# Patient Record
Sex: FEMALE | Race: WHITE | NOT HISPANIC OR LATINO | Employment: OTHER | URBAN - METROPOLITAN AREA
[De-identification: names, ages, dates, MRNs, and addresses within clinical notes are randomized per-mention and may not be internally consistent; named-entity substitution may affect disease eponyms.]

---

## 2018-09-05 ENCOUNTER — OFFICE VISIT (OUTPATIENT)
Dept: MEDICAL GROUP | Facility: MEDICAL CENTER | Age: 78
End: 2018-09-05
Payer: MEDICARE

## 2018-09-05 VITALS
WEIGHT: 162 LBS | HEIGHT: 64 IN | SYSTOLIC BLOOD PRESSURE: 104 MMHG | TEMPERATURE: 97.5 F | RESPIRATION RATE: 15 BRPM | BODY MASS INDEX: 27.66 KG/M2 | OXYGEN SATURATION: 94 % | DIASTOLIC BLOOD PRESSURE: 60 MMHG | HEART RATE: 74 BPM

## 2018-09-05 DIAGNOSIS — Z12.83 SCREENING FOR MALIGNANT NEOPLASM OF SKIN: ICD-10-CM

## 2018-09-05 DIAGNOSIS — R41.3 MEMORY LOSS: ICD-10-CM

## 2018-09-05 DIAGNOSIS — K45.8 OTHER SPECIFIED ABDOMINAL HERNIA WITHOUT OBSTRUCTION OR GANGRENE: ICD-10-CM

## 2018-09-05 DIAGNOSIS — E55.9 VITAMIN D DEFICIENCY: ICD-10-CM

## 2018-09-05 DIAGNOSIS — G47.34 NOCTURNAL OXYGEN DESATURATION: ICD-10-CM

## 2018-09-05 DIAGNOSIS — K82.8 GALLBLADDER SLUDGE: ICD-10-CM

## 2018-09-05 DIAGNOSIS — Z13.6 SCREENING FOR CARDIOVASCULAR CONDITION: ICD-10-CM

## 2018-09-05 DIAGNOSIS — E03.9 HYPOTHYROIDISM, UNSPECIFIED TYPE: ICD-10-CM

## 2018-09-05 PROBLEM — I83.90 VARICOSE VEIN OF LEG: Status: ACTIVE | Noted: 2018-09-05

## 2018-09-05 PROBLEM — M19.90 ARTHRITIS: Status: ACTIVE | Noted: 2018-09-05

## 2018-09-05 PROCEDURE — 99203 OFFICE O/P NEW LOW 30 MIN: CPT | Performed by: NURSE PRACTITIONER

## 2018-09-05 RX ORDER — THYROID 90 MG/1
90 TABLET ORAL DAILY
COMMUNITY
End: 2018-09-05 | Stop reason: SDUPTHER

## 2018-09-05 RX ORDER — DIPHENHYDRAMINE HCL 25 MG
25 TABLET ORAL EVERY 6 HOURS PRN
COMMUNITY

## 2018-09-05 RX ORDER — CITALOPRAM 20 MG/1
TABLET ORAL
COMMUNITY
Start: 2018-07-13 | End: 2018-12-05 | Stop reason: SDUPTHER

## 2018-09-05 RX ORDER — LORATADINE 10 MG/1
10 TABLET ORAL DAILY
COMMUNITY
End: 2021-10-21

## 2018-09-05 RX ORDER — THYROID 60 MG/1
60 TABLET ORAL DAILY
COMMUNITY
End: 2018-09-05 | Stop reason: SDUPTHER

## 2018-09-05 RX ORDER — THYROID 90 MG/1
90 TABLET ORAL
Qty: 90 TAB | Refills: 1 | COMMUNITY
Start: 2018-09-05 | End: 2018-09-17 | Stop reason: SDUPTHER

## 2018-09-05 RX ORDER — THYROID 60 MG/1
60 TABLET ORAL
Qty: 90 TAB | Refills: 1 | COMMUNITY
Start: 2018-09-05 | End: 2018-09-17 | Stop reason: SDUPTHER

## 2018-09-05 RX ORDER — CALCIUM CARBONATE 500 MG/1
500 TABLET, CHEWABLE ORAL DAILY
COMMUNITY
End: 2022-07-07

## 2018-09-05 ASSESSMENT — PATIENT HEALTH QUESTIONNAIRE - PHQ9: CLINICAL INTERPRETATION OF PHQ2 SCORE: 0

## 2018-09-05 NOTE — LETTER
[facility]  Liss Tate A.P.R.N.  [address] [facility] [address]  [place] [address]  Fax: [phone]   Authorization for Release/Disclosure of   Protected Health Information   Name: ANGEL ZAMORA : 1940 SSN: [id number]   Address: [address] [place] [address]   [place] [address] Phone:    [phone] (home)    I authorize the entity listed below to release/disclose the PHI below to:   [facility]/Liss Tate, A.P.R.N. and Liss Tate A.P.R.N.   Provider or Entity Name:  [facility]   Address   City, State, Zip   Phone:      Fax:     Reason for request: continuity of care   Information to be released:    [x  ] LAST COLONOSCOPY,  including any PATH REPORT and follow-up  [  ] LAST FIT/COLOGUARD RESULT [  ] LAST DEXA  [  ] LAST MAMMOGRAM  [  ] LAST PAP  [  ] LAST LABS [  ] RETINA EXAM REPORT  [  ] IMMUNIZATION RECORDS  [  ] Release all info      [  ] Check here and initial the line next to each item to release ALL health information INCLUDING  _____ Care and treatment for drug and / or alcohol abuse  _____ HIV testing, infection status, or AIDS  _____ Genetic Testing    DATES OF SERVICE OR TIME PERIOD TO BE DISCLOSED: _____________  I understand and acknowledge that:  * This Authorization may be revoked at any time by you in writing, except if your health information has already been used or disclosed.  * Your health information that will be used or disclosed as a result of you signing this authorization could be re-disclosed by the recipient. If this occurs, your re-disclosed health information may no longer be protected by State or Federal laws.  * You may refuse to sign this Authorization. Your refusal will not affect your ability to obtain treatment.  * This Authorization becomes effective upon signing and will  on (date) __________.      If no date is indicated, this Authorization will  one (1) year from the signature date.    Name: Agnel Zamora    Signature:   Date:          9/5/2018       PLEASE FAX REQUESTED RECORDS BACK TO: [phone]

## 2018-09-05 NOTE — LETTER
[facility]  Liss Tate A.P.R.N.  [address] [facility] [address]  [place] [address]  Fax: [phone]   Authorization for Release/Disclosure of   Protected Health Information   Name: ANGEL ZAMORA : 1940 SSN: [id number]   Address: [address] [place] [address]   [place] [address] Phone:    [phone] (home)    I authorize the entity listed below to release/disclose the PHI below to:   [facility]/Liss Tate, A.P.R.N. and Liss Tate A.P.R.N.   Provider or Entity Name:  [facility]   Address   City, State, Zip   Phone:      Fax:     Reason for request: continuity of care   Information to be released:    [  ] LAST COLONOSCOPY,  including any PATH REPORT and follow-up  [  ] LAST FIT/COLOGUARD RESULT [  ] LAST DEXA  [  ] LAST MAMMOGRAM  [  ] LAST PAP  [  ] LAST LABS [  ] RETINA EXAM REPORT  [  ] IMMUNIZATION RECORDS  [  x] Release all info      [  ] Check here and initial the line next to each item to release ALL health information INCLUDING  _____ Care and treatment for drug and / or alcohol abuse  _____ HIV testing, infection status, or AIDS  _____ Genetic Testing    DATES OF SERVICE OR TIME PERIOD TO BE DISCLOSED: _____________  I understand and acknowledge that:  * This Authorization may be revoked at any time by you in writing, except if your health information has already been used or disclosed.  * Your health information that will be used or disclosed as a result of you signing this authorization could be re-disclosed by the recipient. If this occurs, your re-disclosed health information may no longer be protected by State or Federal laws.  * You may refuse to sign this Authorization. Your refusal will not affect your ability to obtain treatment.  * This Authorization becomes effective upon signing and will  on (date) __________.      If no date is indicated, this Authorization will  one (1) year from the signature date.    Name: Angel Zamora    Signature:   Date:          9/5/2018       PLEASE FAX REQUESTED RECORDS BACK TO: [phone]

## 2018-09-05 NOTE — PROGRESS NOTES
"CC: establish care, thyroid       Anjum Ruff is a 77 y.o. female here to establish care and to discuss the evaluation and management of:    Former patient of Dr. Hein      1. Hypothyroidism, unspecified type  Patient states that she currently takes a  \"pig\" thyroid and has for about a year.  Last labs for TSH were in January of this year her TSH was 0.351.  States that she has been taking 60 mg of the NP thyroid every other day alternating with the 90 mg of the NP thyroid.    Nocturnal oxygen  Sleep study.  States she was told that her legs were jerking so much during the sleep study they thought she was not getting enough oxygen.  States she has been wearing this for about 2 years.      Gall bladder sludge  Patient states she was told she has some gallbladder sludge.  States that sometimes when she lifts her arms and when she is sitting in her bed or chair crunched up this will cause her discomfort in her right upper quadrant.  States that she has to change position in order for her to feel better.      Abdominal hernia  Patient states she was told she has a hernia in her left lower abdomen.  Denies any nausea, vomiting, pain or change in bowel habits.  D lump, like a hernia a connective tissue has a worn away and the bowel .     Stress  Patient states that she has been taking Celexa since her  .  States that he was a quadraplegic and she was taking care of him.  States that she does feel like she is still under some stress especially financial.      ROS:  Denies any Headache, Blurred Vision, Confusion Chest pain,  Shortness of breath,  Abdominal pain, Changes of bowel or bladder, Lower ext edema, Fevers, Nights sweats, Weight Changes, Focal weakness or numbness.  All other systems are negative.  Stress, abdominal hernia      Current Outpatient Prescriptions:   •  citalopram (CELEXA) 20 MG Tab, , Disp: , Rfl:   •  diphenhydrAMINE (BENADRYL) 25 MG Tab, Take 25 mg by mouth every 6 hours as needed for " "Sleep., Disp: , Rfl:   •  loratadine (CLARITIN) 10 MG Tab, Take 10 mg by mouth every day., Disp: , Rfl:   •  calcium carbonate (TUMS) 500 MG Chew Tab, Take 500 mg by mouth every day., Disp: , Rfl:   •  thyroid (NP THYROID) 60 MG Tab, Take 1 Tab by mouth every 48 hours., Disp: 90 Tab, Rfl: 1  •  thyroid (NP THYROID) 90 MG Tab, Take 1 Tab by mouth every 48 hours., Disp: 90 Tab, Rfl: 1    Allergies   Allergen Reactions   • Vitamin D        Past Medical History:   Diagnosis Date   • Dental infection     waiting for dental infection.   • Hashimoto's thyroiditis    • Hiatal hernia    • Hypothyroid    • Macular degeneration    • Ocular migraine    • Post menopausal syndrome    • Reflux      Past Surgical History:   Procedure Laterality Date   • HIATAL HERNIA REPAIR     • TONSILLECTOMY       Family History   Problem Relation Age of Onset   • Cancer Mother         lung-smoking   • Cancer Sister         colon mets to liver   • Hypertension Sister         melanoma with mets   • Other Sister         enodmetrial cancer     Social History     Social History   • Marital status:      Spouse name: N/A   • Number of children: N/A   • Years of education: N/A     Occupational History   • Not on file.     Social History Main Topics   • Smoking status: Former Smoker   • Smokeless tobacco: Never Used   • Alcohol use Yes      Comment: occ   • Drug use: No   • Sexual activity: Yes     Partners: Male     Other Topics Concern   • Not on file     Social History Narrative   • No narrative on file       Objective:     Vitals: /60   Pulse 74   Temp 36.4 °C (97.5 °F)   Resp 15   Ht 1.613 m (5' 3.5\")   Wt 73.5 kg (162 lb)   SpO2 94%   BMI 28.25 kg/m²      General: Alert, pleasant, NAD  HEENT:  Normocephalic.    Heart:  Regular rate and rhythm.  S1 and S2 normal.  No murmurs appreciated.  Respiratory:  Normal respiratory effort.  Clear to auscultation bilaterally.    Abdomen: Left lower quadrant slight bulge, no pain   skin:  " Warm, dry, no rashes  Musculoskeletal:  Gait is normal.  Moves all extremities well.  Extremities:No leg edema.  Neurological: No tremors, sensation grossly intact  Psych:  Affect/mood is normal, judgement is good, memory is intact, grooming is appropriate.      Assessment and Plan.   77 y.o. female to establish care and discuss following    Diagnoses and all orders for this visit:    Hypothyroidism, unspecified type  Most recent TSH was 0.351 back in January.  She has been taking the NP thyroid 60 mg every other day alternating with the 90 mg.  Continue  current dose.  -     COMP METABOLIC PANEL; Future  -     TSH WITH REFLEX TO FT4; Future    Gallbladder sludge  -     US-LIVER AND BILIARY TREE; Future    Other specified abdominal hernia without obstruction or gangrene  Stable.  No significant abdominal pain, nonobstructing.  Continue to monitor.    Vitamin D deficiency  -     VITAMIN D,25 HYDROXY; Future    Memory loss  -     CBC WITHOUT DIFFERENTIAL; Future  -     VITAMIN B12; Future    Nocturnal oxygen desaturation  Chronic.  Continue wearing 2 L of oxygen at night.      Screening for malignant neoplasm of skin  -     REFERRAL TO DERMATOLOGY    Screening for cardiovascular condition  -     LIPID PROFILE; Future      Health Maintenance:     Return in about 3 months (around 12/5/2018).        Liss FREY

## 2018-09-07 PROBLEM — E55.9 VITAMIN D DEFICIENCY: Status: ACTIVE | Noted: 2018-09-07

## 2018-09-07 PROBLEM — G47.34 NOCTURNAL OXYGEN DESATURATION: Status: ACTIVE | Noted: 2018-09-07

## 2018-09-10 ENCOUNTER — HOSPITAL ENCOUNTER (OUTPATIENT)
Dept: LAB | Facility: MEDICAL CENTER | Age: 78
End: 2018-09-10
Attending: NURSE PRACTITIONER
Payer: MEDICARE

## 2018-09-10 ENCOUNTER — HOSPITAL ENCOUNTER (OUTPATIENT)
Dept: RADIOLOGY | Facility: MEDICAL CENTER | Age: 78
End: 2018-09-10
Attending: NURSE PRACTITIONER
Payer: MEDICARE

## 2018-09-10 DIAGNOSIS — E55.9 VITAMIN D DEFICIENCY: ICD-10-CM

## 2018-09-10 DIAGNOSIS — Z13.6 SCREENING FOR CARDIOVASCULAR CONDITION: ICD-10-CM

## 2018-09-10 DIAGNOSIS — E03.9 HYPOTHYROIDISM, UNSPECIFIED TYPE: ICD-10-CM

## 2018-09-10 DIAGNOSIS — R41.3 MEMORY LOSS: ICD-10-CM

## 2018-09-10 DIAGNOSIS — K82.8 GALLBLADDER SLUDGE: ICD-10-CM

## 2018-09-10 LAB
25(OH)D3 SERPL-MCNC: 29 NG/ML (ref 30–100)
ALBUMIN SERPL BCP-MCNC: 4.1 G/DL (ref 3.2–4.9)
ALBUMIN/GLOB SERPL: 1.3 G/DL
ALP SERPL-CCNC: 36 U/L (ref 30–99)
ALT SERPL-CCNC: 12 U/L (ref 2–50)
ANION GAP SERPL CALC-SCNC: 5 MMOL/L (ref 0–11.9)
AST SERPL-CCNC: 16 U/L (ref 12–45)
BILIRUB SERPL-MCNC: 0.4 MG/DL (ref 0.1–1.5)
BUN SERPL-MCNC: 16 MG/DL (ref 8–22)
CALCIUM SERPL-MCNC: 9.2 MG/DL (ref 8.5–10.5)
CHLORIDE SERPL-SCNC: 103 MMOL/L (ref 96–112)
CHOLEST SERPL-MCNC: 170 MG/DL (ref 100–199)
CO2 SERPL-SCNC: 27 MMOL/L (ref 20–33)
CREAT SERPL-MCNC: 0.65 MG/DL (ref 0.5–1.4)
ERYTHROCYTE [DISTWIDTH] IN BLOOD BY AUTOMATED COUNT: 51.3 FL (ref 35.9–50)
GLOBULIN SER CALC-MCNC: 3.1 G/DL (ref 1.9–3.5)
GLUCOSE SERPL-MCNC: 87 MG/DL (ref 65–99)
HCT VFR BLD AUTO: 38.2 % (ref 37–47)
HDLC SERPL-MCNC: 54 MG/DL
HGB BLD-MCNC: 12.5 G/DL (ref 12–16)
LDLC SERPL CALC-MCNC: 92 MG/DL
MCH RBC QN AUTO: 30.9 PG (ref 27–33)
MCHC RBC AUTO-ENTMCNC: 32.7 G/DL (ref 33.6–35)
MCV RBC AUTO: 94.6 FL (ref 81.4–97.8)
PLATELET # BLD AUTO: 156 K/UL (ref 164–446)
PMV BLD AUTO: 11.6 FL (ref 9–12.9)
POTASSIUM SERPL-SCNC: 3.7 MMOL/L (ref 3.6–5.5)
PROT SERPL-MCNC: 7.2 G/DL (ref 6–8.2)
RBC # BLD AUTO: 4.04 M/UL (ref 4.2–5.4)
SODIUM SERPL-SCNC: 135 MMOL/L (ref 135–145)
TRIGL SERPL-MCNC: 119 MG/DL (ref 0–149)
TSH SERPL DL<=0.005 MIU/L-ACNC: 3.81 UIU/ML (ref 0.38–5.33)
VIT B12 SERPL-MCNC: 269 PG/ML (ref 211–911)
WBC # BLD AUTO: 5.2 K/UL (ref 4.8–10.8)

## 2018-09-10 PROCEDURE — 80061 LIPID PANEL: CPT

## 2018-09-10 PROCEDURE — 36415 COLL VENOUS BLD VENIPUNCTURE: CPT

## 2018-09-10 PROCEDURE — 82306 VITAMIN D 25 HYDROXY: CPT

## 2018-09-10 PROCEDURE — 85027 COMPLETE CBC AUTOMATED: CPT

## 2018-09-10 PROCEDURE — 84443 ASSAY THYROID STIM HORMONE: CPT

## 2018-09-10 PROCEDURE — 82607 VITAMIN B-12: CPT

## 2018-09-10 PROCEDURE — 76705 ECHO EXAM OF ABDOMEN: CPT

## 2018-09-10 PROCEDURE — 80053 COMPREHEN METABOLIC PANEL: CPT

## 2018-09-14 PROBLEM — K20.90 ESOPHAGITIS: Status: ACTIVE | Noted: 2018-09-14

## 2018-09-17 RX ORDER — THYROID 60 MG/1
60 TABLET ORAL
Qty: 90 TAB | Refills: 1 | Status: SHIPPED | OUTPATIENT
Start: 2018-09-17 | End: 2018-12-05 | Stop reason: SDUPTHER

## 2018-09-17 RX ORDER — THYROID 90 MG/1
90 TABLET ORAL
Qty: 90 TAB | Refills: 1 | Status: SHIPPED | OUTPATIENT
Start: 2018-09-17 | End: 2018-12-05 | Stop reason: SDUPTHER

## 2018-12-05 ENCOUNTER — OFFICE VISIT (OUTPATIENT)
Dept: MEDICAL GROUP | Facility: MEDICAL CENTER | Age: 78
End: 2018-12-05
Payer: MEDICARE

## 2018-12-05 VITALS
TEMPERATURE: 97.3 F | HEIGHT: 63 IN | WEIGHT: 166 LBS | OXYGEN SATURATION: 95 % | RESPIRATION RATE: 16 BRPM | DIASTOLIC BLOOD PRESSURE: 70 MMHG | SYSTOLIC BLOOD PRESSURE: 100 MMHG | BODY MASS INDEX: 29.41 KG/M2 | HEART RATE: 73 BPM

## 2018-12-05 DIAGNOSIS — E03.9 HYPOTHYROIDISM, UNSPECIFIED TYPE: ICD-10-CM

## 2018-12-05 DIAGNOSIS — L30.9 ECZEMA, UNSPECIFIED TYPE: ICD-10-CM

## 2018-12-05 DIAGNOSIS — Z23 NEED FOR VACCINATION: ICD-10-CM

## 2018-12-05 DIAGNOSIS — F43.9 STRESS: ICD-10-CM

## 2018-12-05 PROCEDURE — G0009 ADMIN PNEUMOCOCCAL VACCINE: HCPCS | Performed by: NURSE PRACTITIONER

## 2018-12-05 PROCEDURE — 90662 IIV NO PRSV INCREASED AG IM: CPT | Performed by: NURSE PRACTITIONER

## 2018-12-05 PROCEDURE — G0008 ADMIN INFLUENZA VIRUS VAC: HCPCS | Performed by: NURSE PRACTITIONER

## 2018-12-05 PROCEDURE — 90732 PPSV23 VACC 2 YRS+ SUBQ/IM: CPT | Performed by: NURSE PRACTITIONER

## 2018-12-05 PROCEDURE — 99213 OFFICE O/P EST LOW 20 MIN: CPT | Mod: 25 | Performed by: NURSE PRACTITIONER

## 2018-12-05 RX ORDER — THYROID 90 MG/1
90 TABLET ORAL
Qty: 90 TAB | Refills: 1 | Status: SHIPPED | OUTPATIENT
Start: 2018-12-05 | End: 2020-06-09

## 2018-12-05 RX ORDER — CITALOPRAM 20 MG/1
20 TABLET ORAL DAILY
Qty: 90 TAB | Refills: 3 | Status: SHIPPED | OUTPATIENT
Start: 2018-12-05 | End: 2018-12-13 | Stop reason: SDUPTHER

## 2018-12-05 RX ORDER — TRIAMCINOLONE ACETONIDE 1 MG/G
1 CREAM TOPICAL 2 TIMES DAILY
Qty: 1 TUBE | Refills: 3 | Status: SHIPPED | OUTPATIENT
Start: 2018-12-05 | End: 2023-09-21 | Stop reason: SDUPTHER

## 2018-12-05 RX ORDER — THYROID 60 MG/1
60 TABLET ORAL
Qty: 90 TAB | Refills: 1 | Status: SHIPPED | OUTPATIENT
Start: 2018-12-05 | End: 2019-10-21 | Stop reason: SDUPTHER

## 2018-12-05 NOTE — PROGRESS NOTES
"cc:  Follow up thyroid      Subjective:     HPI:     Anjum Ruff is a 78 y.o. female here to discuss the evaluation and management of:      Thyroid  Has been taking her 60 mg of NP thyroid and her 90 mg of NP thyroid on altered states she is feeling relatively euthyroid.  Has had two episodes of feeling shaking, \"numb\" and cold. Has some sugar and it will go away. Has been happening for years.     Stress  Patient states that she continues to take her citalopram for this.  States it is effective.    Dry skin  Patient states that she is been having some dry skin and some itchy patches.  Denies any blisters or persistent rash.    Left hip pain and knee pain  Of note patient just wanted to notify me that she had some left hip pain and some knee pain.  States that she was trying a different pair shoes and then it seemed to aggravate her hip and her knee.  States she took some Motrin and then it resolved.  Currently denies any hip or knee pain.  She already wears orthotics in her shoes.    History of a dental infection  Again patient just wanted to notify me that she had a dental infection and took some antibiotic and it went away.  She does have a left lower dental bridge.    ROS:  Denies any Headache, Blurred Vision, Confusion, Chest pain,  Shortness of breath,  Abdominal pain, Changes of bowel or bladder, Lower ext edema, Fevers, Nights sweats, Weight Changes, Focal weakness or numbness.  And all other systems are negative.        Current Outpatient Prescriptions:   •  triamcinolone acetonide (KENALOG) 0.1 % Cream, Apply 1 g to affected area(s) 2 times a day., Disp: 1 Tube, Rfl: 3  •  thyroid (NP THYROID) 60 MG Tab, Take 1 Tab by mouth every 48 hours., Disp: 90 Tab, Rfl: 1  •  thyroid (NP THYROID) 90 MG Tab, Take 1 Tab by mouth every 48 hours., Disp: 90 Tab, Rfl: 1  •  citalopram (CELEXA) 20 MG Tab, Take 1 Tab by mouth every day., Disp: 90 Tab, Rfl: 3  •  diphenhydrAMINE (BENADRYL) 25 MG Tab, Take 25 mg by mouth " "every 6 hours as needed for Sleep., Disp: , Rfl:   •  loratadine (CLARITIN) 10 MG Tab, Take 10 mg by mouth every day., Disp: , Rfl:   •  calcium carbonate (TUMS) 500 MG Chew Tab, Take 500 mg by mouth every day., Disp: , Rfl:     Allergies   Allergen Reactions   • Vitamin D        Past Medical History:   Diagnosis Date   • Dental infection     waiting for dental infection.   • Hashimoto's thyroiditis    • Hiatal hernia    • Hypothyroid    • Macular degeneration    • Ocular migraine    • Post menopausal syndrome    • Reflux      Past Surgical History:   Procedure Laterality Date   • HIATAL HERNIA REPAIR     • TONSILLECTOMY       Family History   Problem Relation Age of Onset   • Cancer Mother         lung-smoking   • Cancer Sister         colon mets to liver   • Hypertension Sister         melanoma with mets   • Other Sister         enodmetrial cancer     Social History     Social History   • Marital status:      Spouse name: N/A   • Number of children: N/A   • Years of education: N/A     Occupational History   • Not on file.     Social History Main Topics   • Smoking status: Former Smoker   • Smokeless tobacco: Never Used   • Alcohol use Yes      Comment: occ   • Drug use: No   • Sexual activity: Yes     Partners: Male     Other Topics Concern   • Not on file     Social History Narrative   • No narrative on file       Objective:     Vitals: /70   Pulse 73   Temp 36.3 °C (97.3 °F)   Resp 16   Ht 1.6 m (5' 3\")   Wt 75.3 kg (166 lb)   SpO2 95%   BMI 29.41 kg/m²    General: Alert, pleasant, NAD  HEENT: Normocephalic.    Skin: Warm, dry, no rashes.  Dry skin.  Musculoskeletal: Gait is normal.  Moves all extremities well.  Extremities: No leg edema. No discoloration  Neurological: No tremors, sensation grossly intact, gait is normal,   Psych:  Affect/mood is normal, judgement is good, memory is intact, grooming is appropriate.    Assessment/Plan:     Anjum was seen today for follow-up.    Diagnoses " and all orders for this visit:    Hypothyroidism, unspecified type  Stable.  Needs labs.  Refills provided.  We will call her with the results.  -     TSH WITH REFLEX TO FT4; Future  -     BASIC METABOLIC PANEL; Future  -     thyroid (NP THYROID) 60 MG Tab; Take 1 Tab by mouth every 48 hours.  -     thyroid (NP THYROID) 90 MG Tab; Take 1 Tab by mouth every 48 hours.    Eczema, unspecified type  Have discussed with patient to try some Aquaphor ointment on her dry skin and then for her flare she can trial the triamcinolone.  -     triamcinolone acetonide (KENALOG) 0.1 % Cream; Apply 1 g to affected area(s) 2 times a day.    Stress  Stable.  Citalopram effective.  Refills provided.  -     citalopram (CELEXA) 20 MG Tab; Take 1 Tab by mouth every day.    Need for vaccination  -     Influenza Vaccine, High Dose (65+ Only)  -     Pneumococal Polysaccharide Vaccine 23-Valent =>1YO SQ/IM        Return in about 3 months (around 3/5/2019).    {I have placed the above orders and discussed them with an approved delegating provider.  The MA is performing the below orders under the direction of Dr. Artemio FREY

## 2018-12-10 PROBLEM — F43.9 STRESS: Status: ACTIVE | Noted: 2018-12-10

## 2018-12-10 PROBLEM — F43.9 STRESS: Chronic | Status: ACTIVE | Noted: 2018-12-10

## 2018-12-10 PROBLEM — K82.8 GALLBLADDER SLUDGE: Chronic | Status: ACTIVE | Noted: 2018-09-05

## 2018-12-10 PROBLEM — M19.90 ARTHRITIS: Chronic | Status: ACTIVE | Noted: 2018-09-05

## 2018-12-13 DIAGNOSIS — F43.9 STRESS: ICD-10-CM

## 2018-12-14 RX ORDER — CITALOPRAM 20 MG/1
20 TABLET ORAL DAILY
Qty: 90 TAB | Refills: 3 | Status: SHIPPED | OUTPATIENT
Start: 2018-12-14 | End: 2020-03-02

## 2018-12-22 ENCOUNTER — OFFICE VISIT (OUTPATIENT)
Dept: URGENT CARE | Facility: PHYSICIAN GROUP | Age: 78
End: 2018-12-22
Payer: MEDICARE

## 2018-12-22 VITALS
HEIGHT: 63 IN | SYSTOLIC BLOOD PRESSURE: 132 MMHG | OXYGEN SATURATION: 97 % | DIASTOLIC BLOOD PRESSURE: 80 MMHG | HEART RATE: 82 BPM | BODY MASS INDEX: 30.12 KG/M2 | TEMPERATURE: 97.5 F | WEIGHT: 170 LBS

## 2018-12-22 DIAGNOSIS — K04.7 DENTAL INFECTION: ICD-10-CM

## 2018-12-22 PROCEDURE — 99214 OFFICE O/P EST MOD 30 MIN: CPT | Performed by: FAMILY MEDICINE

## 2018-12-22 RX ORDER — CLINDAMYCIN HYDROCHLORIDE 300 MG/1
300 CAPSULE ORAL 3 TIMES DAILY
Qty: 21 CAP | Refills: 0 | Status: SHIPPED | OUTPATIENT
Start: 2018-12-22 | End: 2018-12-29

## 2018-12-23 NOTE — PROGRESS NOTES
Subjective:      Chief Complaint   Patient presents with   • Oral Swelling     left side tooth infection//swelling x1day               Dental Pain   This is a new problem.   C/o rt lower molar pain.    The current episode started in the past 2 days. The problem occurs constantly (constant, throbbing). The problem has been worsening. Pertinent negatives include no chills, congestion, fatigue, fever, nausea, visual change or vomiting. Chewing aggravates the symptoms. Pt has tried tylenol for the symptoms - no improvement.        Social History   Substance Use Topics   • Smoking status: Former Smoker   • Smokeless tobacco: Never Used   • Alcohol use Yes      Comment: occ         Current Outpatient Prescriptions on File Prior to Visit   Medication Sig Dispense Refill   • citalopram (CELEXA) 20 MG Tab Take 1 Tab by mouth every day. 90 Tab 3   • triamcinolone acetonide (KENALOG) 0.1 % Cream Apply 1 g to affected area(s) 2 times a day. 1 Tube 3   • thyroid (NP THYROID) 60 MG Tab Take 1 Tab by mouth every 48 hours. 90 Tab 1   • thyroid (NP THYROID) 90 MG Tab Take 1 Tab by mouth every 48 hours. 90 Tab 1   • diphenhydrAMINE (BENADRYL) 25 MG Tab Take 25 mg by mouth every 6 hours as needed for Sleep.     • loratadine (CLARITIN) 10 MG Tab Take 10 mg by mouth every day.     • calcium carbonate (TUMS) 500 MG Chew Tab Take 500 mg by mouth every day.       No current facility-administered medications on file prior to visit.          Past Medical History:   Diagnosis Date   • Dental infection     waiting for dental infection.   • Hashimoto's thyroiditis    • Hiatal hernia    • Hypothyroid    • Macular degeneration    • Ocular migraine    • Post menopausal syndrome    • Reflux            Review of Systems   Constitutional: Negative for fever, chills and malaise/fatigue.   Eyes: Negative for vision changes, d/c.    Respiratory: Negative for cough and sputum production.    Cardiovascular: Negative for chest pain and palpitations.  "  Gastrointestinal: Negative for nausea, vomiting, abdominal pain, diarrhea and constipation.   Genitourinary: Negative for dysuria, urgency and frequency.   Skin: Negative for rash or  itching.   Neurological: Negative for dizziness and tingling.   Psychiatric/Behavioral: Negative for depression.   Hematologic/lymphatic - denies bruising or excessive bleeding  All other systems reviewed and are negative.       Objective:     Blood pressure 132/80, pulse 82, temperature 36.4 °C (97.5 °F), temperature source Temporal, height 1.6 m (5' 3\"), weight 77.1 kg (170 lb), SpO2 97 %.    Physical Exam   Constitutional: pt is oriented to person, place, and time. Pt appears well-developed. No distress.   HENT:   Head: Normocephalic and atraumatic.   Mouth/Throat:     Gingival swelling and tenderness around 2nd    Lower  Rt Right  molar  Eyes: Conjunctivae are normal. Pupils are equal, round, and reactive to light. No scleral icterus.   Cardiovascular: Normal rate and regular rhythm.    Pulmonary/Chest: Effort normal and breath sounds normal. No respiratory distress. Pt has no wheezes.   Neurological: pt is alert and oriented to person, place, and time. No cranial nerve deficit.   Skin: Skin is warm. He is not diaphoretic. No erythema.   Psychiatric:  behavior is normal.   Nursing note and vitals reviewed.              Assessment/Plan:     1. Dental infection     - clindamycin (CLEOCIN) 300 MG Cap; Take 1 Cap by mouth 3 times a day for 7 days.  Dispense: 21 Cap; Refill: 0      Follow up in one week if no improvement, sooner if symptoms worsen.         "

## 2019-03-13 ENCOUNTER — OFFICE VISIT (OUTPATIENT)
Dept: MEDICAL GROUP | Facility: MEDICAL CENTER | Age: 79
End: 2019-03-13
Payer: MEDICARE

## 2019-03-13 VITALS
TEMPERATURE: 97.9 F | OXYGEN SATURATION: 95 % | HEIGHT: 63 IN | RESPIRATION RATE: 16 BRPM | DIASTOLIC BLOOD PRESSURE: 70 MMHG | SYSTOLIC BLOOD PRESSURE: 118 MMHG | WEIGHT: 164 LBS | BODY MASS INDEX: 29.06 KG/M2 | HEART RATE: 88 BPM

## 2019-03-13 DIAGNOSIS — H91.93 BILATERAL HEARING LOSS, UNSPECIFIED HEARING LOSS TYPE: ICD-10-CM

## 2019-03-13 DIAGNOSIS — E03.9 HYPOTHYROIDISM, UNSPECIFIED TYPE: Chronic | ICD-10-CM

## 2019-03-13 DIAGNOSIS — Z87.891 HISTORY OF NICOTINE USE: ICD-10-CM

## 2019-03-13 DIAGNOSIS — J96.11 CHRONIC RESPIRATORY FAILURE WITH HYPOXIA (HCC): ICD-10-CM

## 2019-03-13 DIAGNOSIS — L30.9 ECZEMA, UNSPECIFIED TYPE: ICD-10-CM

## 2019-03-13 DIAGNOSIS — R05.9 COUGH: ICD-10-CM

## 2019-03-13 DIAGNOSIS — G25.0 ESSENTIAL TREMOR: ICD-10-CM

## 2019-03-13 PROBLEM — K45.8 OTHER SPECIFIED ABDOMINAL HERNIA WITHOUT OBSTRUCTION OR GANGRENE: Status: RESOLVED | Noted: 2018-09-05 | Resolved: 2019-03-13

## 2019-03-13 PROCEDURE — 99214 OFFICE O/P EST MOD 30 MIN: CPT | Performed by: NURSE PRACTITIONER

## 2019-03-13 NOTE — PROGRESS NOTES
cc: 3-month follow up/URI    Subjective:     HPI:     Anjum Ruff is a 78 y.o. female here to discuss the evaluation and management of:      Recent URI  Patient states that she has been having a cold for the last few weeks.  Denies any fevers, nausea, vomiting, diarrhea.  Denies any ear pain, nasal discharge or sore throat at this time.  States that she does have a slight cough.    Thyroid  Patient states that she continues to take her NP thyroid.  States has not obtained her labs yet.  States she does have a large supply.  States she will get this done soon.    Eczema  Patient states that since her last visit the skin irritation on her legs has been resolved with the use of Aquaphor ointment.      Head tremor  Patient states that she does have a slight tremor in her head toward shakes.  Her friend is at the visit today and states that this is been present for several years approximately 10.  No changes.  No resting tremor.  No shuffling gait.      Nocturnal oxygen   Patient states that she forgot to mention on her last visit that she wears oxygen at nighttime on 2 L.  Denies having a CPAP.  States that she did have a sleep study completed and states that she was borderline.  Has not had pulmonary function tests completed.      History of nicotine use   Patient states about age21 to 37 she smoked cigarettes.     Vitamin D deficiency  Patient states she takes a vitamin D supplement for this.    Hearing loss  Wants referral to Audiology. States can't afford an expensive hearing aid but she knows that her hearing has been reduced.      Patient would like to make note that after she received the pneumonia vaccine at her last visit she did have some swelling warmth and redness on her right upper arm.  Denies any fever or chills.  States it resolved in a few days.    ROS:  Denies any Headache, Blurred Vision, Confusion, Chest pain,  Shortness of breath,  Abdominal pain, Changes of bowel or bladder, Lower ext edema,  Fevers, Nights sweats, Weight Changes, Focal weakness or numbness.  And all other systems are negative.  Hearing loss.        Current Outpatient Prescriptions:   •  citalopram (CELEXA) 20 MG Tab, Take 1 Tab by mouth every day., Disp: 90 Tab, Rfl: 3  •  triamcinolone acetonide (KENALOG) 0.1 % Cream, Apply 1 g to affected area(s) 2 times a day., Disp: 1 Tube, Rfl: 3  •  thyroid (NP THYROID) 60 MG Tab, Take 1 Tab by mouth every 48 hours., Disp: 90 Tab, Rfl: 1  •  thyroid (NP THYROID) 90 MG Tab, Take 1 Tab by mouth every 48 hours., Disp: 90 Tab, Rfl: 1  •  diphenhydrAMINE (BENADRYL) 25 MG Tab, Take 25 mg by mouth every 6 hours as needed for Sleep., Disp: , Rfl:   •  calcium carbonate (TUMS) 500 MG Chew Tab, Take 500 mg by mouth every day., Disp: , Rfl:   •  loratadine (CLARITIN) 10 MG Tab, Take 10 mg by mouth every day., Disp: , Rfl:     Allergies   Allergen Reactions   • Vitamin D        Past Medical History:   Diagnosis Date   • Dental infection     waiting for dental infection.   • Hashimoto's thyroiditis    • Hiatal hernia    • Hypothyroid    • Macular degeneration    • Ocular migraine    • Other specified abdominal hernia without obstruction or gangrene 9/5/2018   • Post menopausal syndrome    • Reflux      Past Surgical History:   Procedure Laterality Date   • HIATAL HERNIA REPAIR     • TONSILLECTOMY       Family History   Problem Relation Age of Onset   • Cancer Mother         lung-smoking   • Cancer Sister         colon mets to liver   • Hypertension Sister         melanoma with mets   • Other Sister         enodmetrial cancer     Social History     Social History   • Marital status:      Spouse name: N/A   • Number of children: N/A   • Years of education: N/A     Occupational History   • Not on file.     Social History Main Topics   • Smoking status: Former Smoker   • Smokeless tobacco: Never Used   • Alcohol use Yes      Comment: occ   • Drug use: No   • Sexual activity: Yes     Partners: Male     Other  "Topics Concern   • Not on file     Social History Narrative   • No narrative on file       Objective:     Vitals: /70   Pulse 88   Temp 36.6 °C (97.9 °F)   Resp 16   Ht 1.6 m (5' 3\")   Wt 74.4 kg (164 lb)   SpO2 95%   BMI 29.05 kg/m²    General: Alert, pleasant, NAD  HEENT: Normocephalic.    Heart: Regular rate and rhythm.  S1 and S2 normal.  No murmurs appreciated.  Respiratory: Normal respiratory effort.  Clear to auscultation bilaterally.  Skin: Warm, dry, no rashes.  Musculoskeletal: Gait is normal.  Moves all extremities well.  Extremities: No leg edema. No discoloration  Neurological: No tremors, sensation grossly intact, gait is normal,   Psych:  Affect/mood is normal, judgement is good, memory is intact, grooming is appropriate.    Assessment/Plan:     Diagnoses and all orders for this visit:    Hypothyroidism, unspecified type  Stable.  Have encouraged her to obtain her labs.  Continue current regimen.    Chronic respiratory failure with hypoxia (HCC)   patient notified me that she does wear 2 L of oxygen by nasal cannula at night.  States she did have a sleep study several years ago and was told that she was borderline with sleep apnea.  Does not wear CPAP.    Bilateral hearing loss, unspecified hearing loss type  -     REFERRAL TO AUDIOLOGY    Eczema, unspecified type  This improved after using Aquaphor.    History of nicotine use  Continues to abstain from all nicotine products.    Essential tremor  Has had for approximately 10 years.  No changes, no worsening frequency.    Cough  Patient states that she developed a cough recently.  She denies any fevers, chills, nausea or vomiting.  States that she did have a cold for several weeks now.  Denies any shortness of breath or wheezing.  Discussed viral etiology.  Encourage hydration.  If this worsens we may further workup.        No Follow-up on file.        Liss FREY"

## 2019-06-13 ENCOUNTER — OFFICE VISIT (OUTPATIENT)
Dept: MEDICAL GROUP | Facility: MEDICAL CENTER | Age: 79
End: 2019-06-13
Payer: MEDICARE

## 2019-06-13 VITALS
HEIGHT: 63 IN | RESPIRATION RATE: 16 BRPM | TEMPERATURE: 96.6 F | DIASTOLIC BLOOD PRESSURE: 70 MMHG | WEIGHT: 161 LBS | BODY MASS INDEX: 28.53 KG/M2 | SYSTOLIC BLOOD PRESSURE: 122 MMHG | HEART RATE: 64 BPM | OXYGEN SATURATION: 94 %

## 2019-06-13 DIAGNOSIS — I83.892 VARICOSE VEINS OF LEFT LOWER EXTREMITY WITH OTHER COMPLICATIONS: ICD-10-CM

## 2019-06-13 DIAGNOSIS — R10.11 RIGHT UPPER QUADRANT PAIN: ICD-10-CM

## 2019-06-13 PROCEDURE — 99213 OFFICE O/P EST LOW 20 MIN: CPT | Performed by: NURSE PRACTITIONER

## 2019-06-13 ASSESSMENT — PATIENT HEALTH QUESTIONNAIRE - PHQ9: CLINICAL INTERPRETATION OF PHQ2 SCORE: 0

## 2019-06-13 NOTE — PROGRESS NOTES
"cc: Varicose veins/right upper quadrant pain      Subjective:     HPI:     Anjum Ruff is a 78 y.o. female here to discuss the evaluation and management of:    Varicose veins  Patient states that she does have some varicose veins especially on her left outer leg by her knee.  Denies any overt aching, pain, itchiness, redness, fevers, swelling.  She does state that it \"feels hot.\"  Does not wear any compression stockings at this time.  Has slight edema occasionally.     Right upper quad pain  Patient states that she intermittently has pain.  States she does have some \"gallbladder sludge.\" there is no specific pattern with any foods or liquids.  Denies any nausea, vomiting, diarrhea or change in stools.  Denies any weight loss or night sweats.  Have discussed with patient that this may be stool moving through her colon in that section.  Having encouraged her to use Benefiber and increase her water intake.      Have advised patient she still has pending labs.    ROS:  Denies any Headache, Blurred Vision, Confusion, Chest pain,  Shortness of breath,  Abdominal pain, Changes of bowel or bladder, Lower ext edema, Fevers, Nights sweats, Weight Changes, Focal weakness or numbness.  And all other systems are negative.  Varicose veins, right upper quadrant discomfort      Current Outpatient Prescriptions:   •  citalopram (CELEXA) 20 MG Tab, Take 1 Tab by mouth every day., Disp: 90 Tab, Rfl: 3  •  triamcinolone acetonide (KENALOG) 0.1 % Cream, Apply 1 g to affected area(s) 2 times a day., Disp: 1 Tube, Rfl: 3  •  thyroid (NP THYROID) 60 MG Tab, Take 1 Tab by mouth every 48 hours., Disp: 90 Tab, Rfl: 1  •  thyroid (NP THYROID) 90 MG Tab, Take 1 Tab by mouth every 48 hours., Disp: 90 Tab, Rfl: 1  •  diphenhydrAMINE (BENADRYL) 25 MG Tab, Take 25 mg by mouth every 6 hours as needed for Sleep., Disp: , Rfl:   •  calcium carbonate (TUMS) 500 MG Chew Tab, Take 500 mg by mouth every day., Disp: , Rfl:   •  loratadine (CLARITIN) 10 " "MG Tab, Take 10 mg by mouth every day., Disp: , Rfl:     Allergies   Allergen Reactions   • Vitamin D        Past Medical History:   Diagnosis Date   • Dental infection     waiting for dental infection.   • Hashimoto's thyroiditis    • Hiatal hernia    • Hypothyroid    • Macular degeneration    • Ocular migraine    • Other specified abdominal hernia without obstruction or gangrene 9/5/2018   • Post menopausal syndrome    • Reflux      Past Surgical History:   Procedure Laterality Date   • HIATAL HERNIA REPAIR     • TONSILLECTOMY       Family History   Problem Relation Age of Onset   • Cancer Mother         lung-smoking   • Cancer Sister         colon mets to liver   • Hypertension Sister         melanoma with mets   • Other Sister         enodmetrial cancer     Social History     Social History   • Marital status:      Spouse name: N/A   • Number of children: N/A   • Years of education: N/A     Occupational History   • Not on file.     Social History Main Topics   • Smoking status: Former Smoker   • Smokeless tobacco: Never Used   • Alcohol use Yes      Comment: occ   • Drug use: No   • Sexual activity: Yes     Partners: Male     Other Topics Concern   • Not on file     Social History Narrative   • No narrative on file       Objective:     Vitals: /70   Pulse 64   Temp 35.9 °C (96.6 °F)   Resp 16   Ht 1.6 m (5' 3\")   Wt 73 kg (161 lb)   SpO2 94%   BMI 28.52 kg/m²    General: Alert, pleasant, NAD  HEENT: Normocephalic.    Skin: Warm, dry, no rashes.  Extremities: No leg edema. No discoloration  Neurological: No tremors  Psych:  Affect/mood is normal, judgement is good, memory is intact, grooming is appropriate.    Assessment/Plan:     Diagnoses and all orders for this visit:    Varicose veins of left lower extremity with other complications  Have encouraged patient to wear compression stockings, elevation.  Have also discussed with her that she may follow-up with a vein specialist or vascular " surgeon if she would like to consult with them.  She agrees to plan.    Right upper quadrant pain  No acute distress at this time.  Vital signs stable.  No specific pattern identified.  Denies any nausea, vomiting, diarrhea, change in bowels, weight loss or night sweats.  She is having stools regularly.  Have recommended increasing her fiber and water intake.  Follow-up if symptoms persist or worsen.  She did have a ultrasound in September 2018 which presented with sludge in gallbladder.        Return in about 6 months (around 12/13/2019).          Liss NORTH.

## 2019-06-17 PROBLEM — J96.11 CHRONIC RESPIRATORY FAILURE WITH HYPOXIA (HCC): Chronic | Status: ACTIVE | Noted: 2019-03-13

## 2019-06-17 PROBLEM — G47.34 NOCTURNAL OXYGEN DESATURATION: Chronic | Status: ACTIVE | Noted: 2018-09-07

## 2019-09-25 ENCOUNTER — HOSPITAL ENCOUNTER (OUTPATIENT)
Dept: RADIOLOGY | Facility: MEDICAL CENTER | Age: 79
End: 2019-09-25
Attending: NURSE PRACTITIONER
Payer: MEDICARE

## 2019-09-25 ENCOUNTER — APPOINTMENT (OUTPATIENT)
Dept: LAB | Facility: MEDICAL CENTER | Age: 79
End: 2019-09-25
Attending: NURSE PRACTITIONER
Payer: MEDICARE

## 2019-09-25 ENCOUNTER — OFFICE VISIT (OUTPATIENT)
Dept: MEDICAL GROUP | Facility: MEDICAL CENTER | Age: 79
End: 2019-09-25
Payer: MEDICARE

## 2019-09-25 VITALS
DIASTOLIC BLOOD PRESSURE: 70 MMHG | SYSTOLIC BLOOD PRESSURE: 122 MMHG | WEIGHT: 161.4 LBS | BODY MASS INDEX: 27.55 KG/M2 | HEIGHT: 64 IN | TEMPERATURE: 97.2 F | HEART RATE: 82 BPM | OXYGEN SATURATION: 93 %

## 2019-09-25 DIAGNOSIS — J01.90 ACUTE SINUSITIS, RECURRENCE NOT SPECIFIED, UNSPECIFIED LOCATION: ICD-10-CM

## 2019-09-25 DIAGNOSIS — Z78.0 POSTMENOPAUSAL: ICD-10-CM

## 2019-09-25 DIAGNOSIS — R06.02 SHORTNESS OF BREATH: ICD-10-CM

## 2019-09-25 DIAGNOSIS — I35.8 AORTIC HEART MURMUR: ICD-10-CM

## 2019-09-25 DIAGNOSIS — R07.89 PRESSURE IN CHEST: ICD-10-CM

## 2019-09-25 PROCEDURE — 71046 X-RAY EXAM CHEST 2 VIEWS: CPT

## 2019-09-25 PROCEDURE — 99214 OFFICE O/P EST MOD 30 MIN: CPT | Performed by: NURSE PRACTITIONER

## 2019-09-25 RX ORDER — AMOXICILLIN AND CLAVULANATE POTASSIUM 875; 125 MG/1; MG/1
1 TABLET, FILM COATED ORAL 2 TIMES DAILY
Qty: 20 TAB | Refills: 0 | Status: SHIPPED | OUTPATIENT
Start: 2019-09-25 | End: 2019-10-21

## 2019-09-25 NOTE — PROGRESS NOTES
"cc: Cough      Subjective:     HPI:     Anjum Ruff is a 78 y.o. female here to discuss the evaluation and management of:    Patient presents with a cough for about 10 days. Having nasal congestion-some green. No fevers. Stopped taking Mucinex, taking an allergy pill.  Having postnasal drip and cough with phlegm production.  States she swallows so she does not see the color of the phlegm.  Does have some shortness of breath and pressure in her chest.      Patient also states that she is been feeling a pressure in the center of her chest just on her sternum for about 6 months or more.  States she knows she does have reflux and she only takes a \"bite of a Chase\" and states that this will help settle the reflux down.  States she cannot lay flat on her back and she cannot lay on her side because of the pressure.  No leg swelling.    ROS:  Denies any Headache, Blurred Vision, Confusion, Chest pain,  Shortness of breath,  Abdominal pain, Changes of bowel or bladder, Lower ext edema, Fevers, Nights sweats, Weight Changes, Focal weakness or numbness.  And all other systems are negative.chest pressure, cough, nasal congestion        Current Outpatient Medications:   •  amoxicillin-clavulanate (AUGMENTIN) 875-125 MG Tab, Take 1 Tab by mouth 2 times a day., Disp: 20 Tab, Rfl: 0  •  citalopram (CELEXA) 20 MG Tab, Take 1 Tab by mouth every day., Disp: 90 Tab, Rfl: 3  •  triamcinolone acetonide (KENALOG) 0.1 % Cream, Apply 1 g to affected area(s) 2 times a day., Disp: 1 Tube, Rfl: 3  •  thyroid (NP THYROID) 60 MG Tab, Take 1 Tab by mouth every 48 hours., Disp: 90 Tab, Rfl: 1  •  thyroid (NP THYROID) 90 MG Tab, Take 1 Tab by mouth every 48 hours., Disp: 90 Tab, Rfl: 1  •  diphenhydrAMINE (BENADRYL) 25 MG Tab, Take 25 mg by mouth every 6 hours as needed for Sleep., Disp: , Rfl:   •  loratadine (CLARITIN) 10 MG Tab, Take 10 mg by mouth every day., Disp: , Rfl:   •  calcium carbonate (TUMS) 500 MG Chew Tab, Take 500 mg by mouth " every day., Disp: , Rfl:     Allergies   Allergen Reactions   • Vitamin D        Past Medical History:   Diagnosis Date   • Dental infection     waiting for dental infection.   • Hashimoto's thyroiditis    • Hiatal hernia    • Hypothyroid    • Macular degeneration    • Ocular migraine    • Other specified abdominal hernia without obstruction or gangrene 9/5/2018   • Post menopausal syndrome    • Reflux      Past Surgical History:   Procedure Laterality Date   • HIATAL HERNIA REPAIR     • TONSILLECTOMY       Family History   Problem Relation Age of Onset   • Cancer Mother         lung-smoking   • Cancer Sister         colon mets to liver   • Hypertension Sister         melanoma with mets   • Other Sister         enodmetrial cancer     Social History     Socioeconomic History   • Marital status:      Spouse name: Not on file   • Number of children: Not on file   • Years of education: Not on file   • Highest education level: Not on file   Occupational History   • Not on file   Social Needs   • Financial resource strain: Not on file   • Food insecurity:     Worry: Not on file     Inability: Not on file   • Transportation needs:     Medical: Not on file     Non-medical: Not on file   Tobacco Use   • Smoking status: Former Smoker   • Smokeless tobacco: Never Used   Substance and Sexual Activity   • Alcohol use: Yes     Comment: occ   • Drug use: No   • Sexual activity: Yes     Partners: Male   Lifestyle   • Physical activity:     Days per week: Not on file     Minutes per session: Not on file   • Stress: Not on file   Relationships   • Social connections:     Talks on phone: Not on file     Gets together: Not on file     Attends Rastafari service: Not on file     Active member of club or organization: Not on file     Attends meetings of clubs or organizations: Not on file     Relationship status: Not on file   • Intimate partner violence:     Fear of current or ex partner: Not on file     Emotionally abused: Not on  "file     Physically abused: Not on file     Forced sexual activity: Not on file   Other Topics Concern   • Not on file   Social History Narrative   • Not on file       Objective:     Vitals: /70 (BP Location: Right arm, Patient Position: Sitting, BP Cuff Size: Adult)   Pulse 82   Temp 36.2 °C (97.2 °F) (Temporal)   Ht 1.613 m (5' 3.5\")   Wt 73.2 kg (161 lb 6.4 oz)   SpO2 93%   BMI 28.14 kg/m²    General: Alert, pleasant, NAD  HEENT: Normocephalic.  Nasal congestion  Heart: Regular rate and rhythm.  S1 and S2 normal.  +murmur  Respiratory: Shallow and diminished, few scattered rhonchi  Skin: Warm, dry, no rashes.  Extremities: No leg edema. No discoloration  Neurological: No tremors  Psych:  Affect/mood is normal, judgement is good, memory is intact, grooming is appropriate.    Assessment/Plan:     Anjum was seen today for cough.    Diagnoses and all orders for this visit:    Acute sinusitis, recurrence not specified, unspecified location  -     amoxicillin-clavulanate (AUGMENTIN) 875-125 MG Tab; Take 1 Tab by mouth 2 times a day.    Shortness of breath  -     DX-CHEST-2 VIEWS; Future    Aortic heart murmur  [place] on exam in clinic today.  -     EC-ECHOCARDIOGRAM COMPLETE W/O CONT; Future    Pressure in chest  Has been present for more than 6 months.  Patient is stable in the clinic.  Denies any orthopnea.  Vital signs are stable.  Given the length of time that she has had these symptoms low suspicion for a acute MI.  She will follow back up in 2 weeks to review after her echocardiogram.  Will order EKG and stress test.    Postmenopausal  Recommend 1200 mg of calcium and 800 international units of vitamin D3 daily, weightbearing exercises.  -     DS-BONE DENSITY STUDY (DEXA); Future        Return if symptoms worsen or fail to improve.          Liss FREY  "

## 2019-09-30 ENCOUNTER — TELEPHONE (OUTPATIENT)
Dept: MEDICAL GROUP | Facility: MEDICAL CENTER | Age: 79
End: 2019-09-30

## 2019-09-30 NOTE — TELEPHONE ENCOUNTER
1. Caller Name: Anjum Ruff                                         Call Back Number: [phone] (home)       Patient approves a detailed voicemail message: N\A    Pt started to experience nausea, vomiting, and change color of urine. Pt stopped taking the medication and is feeling much better. She did have 6 more tabs to take but she states she is feeling better.  I informed pt that if she experience any more symptoms to call us so we can get her in.

## 2019-10-03 ENCOUNTER — HOSPITAL ENCOUNTER (OUTPATIENT)
Dept: CARDIOLOGY | Facility: MEDICAL CENTER | Age: 79
End: 2019-10-03
Attending: NURSE PRACTITIONER
Payer: MEDICARE

## 2019-10-03 DIAGNOSIS — R06.02 SHORTNESS OF BREATH: ICD-10-CM

## 2019-10-03 LAB
LV EJECT FRACT  99904: 65
LV EJECT FRACT MOD 2C 99903: 69.01
LV EJECT FRACT MOD 4C 99902: 68.56
LV EJECT FRACT MOD BP 99901: 68.08

## 2019-10-03 PROCEDURE — 93306 TTE W/DOPPLER COMPLETE: CPT | Mod: 26 | Performed by: INTERNAL MEDICINE

## 2019-10-03 PROCEDURE — 93306 TTE W/DOPPLER COMPLETE: CPT

## 2019-10-21 ENCOUNTER — OFFICE VISIT (OUTPATIENT)
Dept: MEDICAL GROUP | Facility: MEDICAL CENTER | Age: 79
End: 2019-10-21
Payer: MEDICARE

## 2019-10-21 VITALS
BODY MASS INDEX: 28.17 KG/M2 | SYSTOLIC BLOOD PRESSURE: 120 MMHG | OXYGEN SATURATION: 96 % | RESPIRATION RATE: 16 BRPM | DIASTOLIC BLOOD PRESSURE: 80 MMHG | HEART RATE: 76 BPM | TEMPERATURE: 97.2 F | HEIGHT: 63 IN | WEIGHT: 159 LBS

## 2019-10-21 DIAGNOSIS — R07.89 CHEST HEAVINESS: ICD-10-CM

## 2019-10-21 DIAGNOSIS — K21.9 GASTROESOPHAGEAL REFLUX DISEASE, ESOPHAGITIS PRESENCE NOT SPECIFIED: ICD-10-CM

## 2019-10-21 DIAGNOSIS — E03.9 HYPOTHYROIDISM, UNSPECIFIED TYPE: ICD-10-CM

## 2019-10-21 DIAGNOSIS — Z23 NEED FOR VACCINATION: ICD-10-CM

## 2019-10-21 DIAGNOSIS — I27.20 PULMONARY HYPERTENSION (HCC): ICD-10-CM

## 2019-10-21 PROCEDURE — 99214 OFFICE O/P EST MOD 30 MIN: CPT | Mod: 25 | Performed by: NURSE PRACTITIONER

## 2019-10-21 PROCEDURE — G0008 ADMIN INFLUENZA VIRUS VAC: HCPCS | Performed by: NURSE PRACTITIONER

## 2019-10-21 PROCEDURE — 90662 IIV NO PRSV INCREASED AG IM: CPT | Performed by: NURSE PRACTITIONER

## 2019-10-21 RX ORDER — THYROID 90 MG/1
90 TABLET ORAL
Qty: 45 TAB | Refills: 3 | Status: SHIPPED | OUTPATIENT
Start: 2019-10-21 | End: 2020-06-16

## 2019-10-21 RX ORDER — THYROID 60 MG/1
60 TABLET ORAL
Qty: 45 TAB | Refills: 3 | Status: SHIPPED | OUTPATIENT
Start: 2019-10-21 | End: 2020-06-16

## 2019-10-21 NOTE — PROGRESS NOTES
"cc:  Thyroid labs/chest heaviness, acid reflux      Subjective:     HPI:     Anjum Ruff is a 78 y.o. female here to discuss the evaluation and management of:    1. Chest heaviness  Patient states she has been feeling heavy chested for about one year. States having fatigue, heaviness in center of chest at night when laying down. Not short of breath.  No orthopnea.  States she has 13 steps at home and does fine with going up and down the steps and is not short of breath.  Denies any leg swelling.  She does have a hospital bed at home that she does raise because of her \"reflex she feels as if she is having.  States if she does lay her bed flat she will have this burning in her chest.  Denies any shortness of breath when laying down.  Wearing 2L oxygen at night.     2. Hypothyroidism, unspecified type  Recent TSH 1.834. Feeling euthyroid. Taking NP thyroid 60 and 90 every other day.     3. Pulmonary hypertension (HCC)  Found on recent Echo. RSVP at 36mmHg. LEF at 65. No valve abnormalities.    4. Gastroesophageal reflux disease, esophagitis presence not specified  Takes a bite of Tums when she has burning in her esophagus. She states that she does this about three times per day. She reports she did see GI in the past, Dr. Chong. Will needed updated referral.   No abdomen pain, some loss of appetite. Three times per day. No blood in stool.     5. Need for vaccination  Due for flu vaccine    ROS:  Denies any Headache, Blurred Vision, Confusion, Chest pain,  Shortness of breath,  Abdominal pain, Changes of bowel or bladder, Lower ext edema, Fevers, Nights sweats, Weight Changes, Focal weakness or numbness.  And all other systems are negative.  Burning sensation in her chest, chest heaviness        Current Outpatient Medications:   •  thyroid (NP THYROID) 90 MG Tab, Take 1 Tab by mouth every 48 hours., Disp: 45 Tab, Rfl: 3  •  thyroid (NP THYROID) 60 MG Tab, Take 1 Tab by mouth every 48 hours., Disp: 45 Tab, " Rfl: 3  •  citalopram (CELEXA) 20 MG Tab, Take 1 Tab by mouth every day., Disp: 90 Tab, Rfl: 3  •  triamcinolone acetonide (KENALOG) 0.1 % Cream, Apply 1 g to affected area(s) 2 times a day., Disp: 1 Tube, Rfl: 3  •  diphenhydrAMINE (BENADRYL) 25 MG Tab, Take 25 mg by mouth every 6 hours as needed for Sleep., Disp: , Rfl:   •  loratadine (CLARITIN) 10 MG Tab, Take 10 mg by mouth every day., Disp: , Rfl:   •  calcium carbonate (TUMS) 500 MG Chew Tab, Take 500 mg by mouth every day., Disp: , Rfl:   •  thyroid (NP THYROID) 60 MG Tab, Take 1 Tab by mouth every 48 hours. Needs labs before any more refills (Patient not taking: Reported on 10/21/2019), Disp: 15 Tab, Rfl: 0  •  thyroid (NP THYROID) 90 MG Tab, Take 1 Tab by mouth every 48 hours. (Patient not taking: Reported on 10/21/2019), Disp: 90 Tab, Rfl: 1    Allergies   Allergen Reactions   • Vitamin D        Past Medical History:   Diagnosis Date   • Dental infection     waiting for dental infection.   • Hashimoto's thyroiditis    • Hiatal hernia    • Hypothyroid    • Macular degeneration    • Ocular migraine    • Other specified abdominal hernia without obstruction or gangrene 9/5/2018   • Post menopausal syndrome    • Reflux      Past Surgical History:   Procedure Laterality Date   • HIATAL HERNIA REPAIR     • TONSILLECTOMY       Family History   Problem Relation Age of Onset   • Cancer Mother         lung-smoking   • Cancer Sister         colon mets to liver   • Hypertension Sister         melanoma with mets   • Other Sister         enodmetrial cancer     Social History     Socioeconomic History   • Marital status:      Spouse name: Not on file   • Number of children: Not on file   • Years of education: Not on file   • Highest education level: Not on file   Occupational History   • Not on file   Social Needs   • Financial resource strain: Not on file   • Food insecurity:     Worry: Not on file     Inability: Not on file   • Transportation needs:     Medical:  "Not on file     Non-medical: Not on file   Tobacco Use   • Smoking status: Former Smoker   • Smokeless tobacco: Never Used   Substance and Sexual Activity   • Alcohol use: Yes     Comment: occ   • Drug use: No   • Sexual activity: Yes     Partners: Male   Lifestyle   • Physical activity:     Days per week: Not on file     Minutes per session: Not on file   • Stress: Not on file   Relationships   • Social connections:     Talks on phone: Not on file     Gets together: Not on file     Attends Episcopalian service: Not on file     Active member of club or organization: Not on file     Attends meetings of clubs or organizations: Not on file     Relationship status: Not on file   • Intimate partner violence:     Fear of current or ex partner: Not on file     Emotionally abused: Not on file     Physically abused: Not on file     Forced sexual activity: Not on file   Other Topics Concern   • Not on file   Social History Narrative   • Not on file       Objective:     Vitals: /80   Pulse 76   Temp 36.2 °C (97.2 °F)   Resp 16   Ht 1.6 m (5' 3\")   Wt 72.1 kg (159 lb)   SpO2 96%   BMI 28.17 kg/m²    General: Alert, pleasant, NAD  HEENT: Normocephalic.    Heart: Regular rate and rhythm.  S1 and S2 normal.  No murmurs appreciated.  Respiratory: Normal respiratory effort.  Clear to auscultation bilaterally. No wheezing  Skin: Warm, dry, no rashes.  Extremities: No leg edema. No discoloration  Neurological: No tremors  Psych:  Affect/mood is normal, judgement is good, memory is intact, grooming is appropriate.    Assessment/Plan:     Diagnoses and all orders for this visit:    Chest heaviness  Patient is stable in the clinic.  She has apparently been having intermittent chest heaviness for the last year.  Denies any in the clinic.  Have discussed possible etiologies and emergent precautions.  Patient has completed a echocardiogram and a chest x-ray along with labs.  Mild pulmonary hypertension.  Have also discussed " following up with gastroenterology as she does complain of having acid reflux that is persistent.  Have also discussed stress test.    Hypothyroidism, unspecified type  Continue current regimen at this time.  Most recent TSH within normal.  -     thyroid (NP THYROID) 60 MG Tab; Take 1 Tab by mouth every 48 hours.  -     thyroid (NP THYROID) 90 MG Tab; Take 1 Tab by mouth every 48 hours.    Pulmonary hypertension (HCC)  Slight elevation in her ROS the PE on her most recent echo.  Have reviewed with the patient.    Gastroesophageal reflux disease, esophagitis presence not specified  I recommended patient to trial omeprazole or Pepcid daily since she seems to be taking Tums several times a day and having to sleep with her bed elevated.  Recommend EGD.  -     REFERRAL TO GASTROENTEROLOGY    Need for vaccination  -     INFLUENZA VACCINE, HIGH DOSE (65+ ONLY)      Return in about 4 weeks (around 11/18/2019), or if symptoms worsen or fail to improve.        Liss NORTH.

## 2020-03-02 DIAGNOSIS — F43.9 STRESS: ICD-10-CM

## 2020-03-02 RX ORDER — CITALOPRAM 20 MG/1
20 TABLET ORAL DAILY
Qty: 90 TAB | Refills: 3 | Status: SHIPPED | OUTPATIENT
Start: 2020-03-02 | End: 2021-04-09

## 2020-06-09 ENCOUNTER — TELEMEDICINE (OUTPATIENT)
Dept: MEDICAL GROUP | Facility: MEDICAL CENTER | Age: 80
End: 2020-06-09
Payer: MEDICARE

## 2020-06-09 VITALS — HEIGHT: 63 IN | WEIGHT: 160 LBS | BODY MASS INDEX: 28.35 KG/M2 | RESPIRATION RATE: 14 BRPM

## 2020-06-09 DIAGNOSIS — E03.9 HYPOTHYROIDISM, UNSPECIFIED TYPE: Chronic | ICD-10-CM

## 2020-06-09 DIAGNOSIS — M54.42 CHRONIC LEFT-SIDED LOW BACK PAIN WITH LEFT-SIDED SCIATICA: ICD-10-CM

## 2020-06-09 DIAGNOSIS — K21.9 GASTROESOPHAGEAL REFLUX DISEASE, ESOPHAGITIS PRESENCE NOT SPECIFIED: ICD-10-CM

## 2020-06-09 DIAGNOSIS — Z01.84 IMMUNITY STATUS TESTING: ICD-10-CM

## 2020-06-09 DIAGNOSIS — E55.9 VITAMIN D DEFICIENCY: ICD-10-CM

## 2020-06-09 DIAGNOSIS — G89.29 CHRONIC LEFT-SIDED LOW BACK PAIN WITH LEFT-SIDED SCIATICA: ICD-10-CM

## 2020-06-09 DIAGNOSIS — Z13.6 SCREENING FOR CARDIOVASCULAR CONDITION: ICD-10-CM

## 2020-06-09 PROCEDURE — 99214 OFFICE O/P EST MOD 30 MIN: CPT | Mod: 95,CR | Performed by: NURSE PRACTITIONER

## 2020-06-09 RX ORDER — MELOXICAM 15 MG/1
15 TABLET ORAL DAILY
Qty: 90 TAB | Refills: 0 | Status: SHIPPED | OUTPATIENT
Start: 2020-06-09 | End: 2022-07-07

## 2020-06-09 RX ORDER — MECOBALAMIN 5000 MCG
30 TABLET,DISINTEGRATING ORAL DAILY
Qty: 30 CAP | COMMUNITY
Start: 2020-06-09 | End: 2022-07-07

## 2020-06-09 ASSESSMENT — FIBROSIS 4 INDEX: FIB4 SCORE: 2.28

## 2020-06-09 ASSESSMENT — PAIN SCALES - GENERAL: PAINLEVEL: 10=SEVERE PAIN

## 2020-06-09 NOTE — PROGRESS NOTES
Telemedicine Video Visit: Established Patient   This Remote Face to Face encounter was conducted via Zoom. Given the importance of social distancing and other strategies recommended to reduce the risk of COVID-19 transmission, I am providing medical care to this patient via audio/video visit in place of an in person visit at the request of the patient. Verbal consent to telehealth, risks, benefits, and consequences were discussed. Patient retains the right to withdraw at any time. All existing confidentiality protections apply. The patient has access to all transmitted medical information. No dissemination of any patient images or information to other entities without further written consent.  Subjective:     Chief Complaint   Patient presents with   • Back Pain       Anjum Ruff is a 79 y.o. female presenting for evaluation and management of:    Patient presents with complaints of having back pain.  She feels as if her sciatic nerve has been out for many weeks. She had lifted some heavy boxes prior. More painful when she is going from a sitting to standing position.  She has a burning sensation on the left lower back.  States that she can get a pain down to the outside of her left ankle.  States it is really bad her pain is a 10 pain.  No pain when sitting or laying down just the burning sensation. Taking ibuprofen.  No loss of bowel or bladder. No saddle anesthesia. Ibuprofen was helpful but would like Meloxicam as she has had this in the remote past.     Have informed her she has blood work pending.       ROS back pain.   Denies any recent fevers or chills. No nausea or vomiting. No chest pains or shortness of breath.     Allergies   Allergen Reactions   • Vitamin D        Current medicines (including changes today)  Current Outpatient Medications   Medication Sig Dispense Refill   • meloxicam (MOBIC) 15 MG tablet Take 1 Tab by mouth every day. 90 Tab 0   • lansoprazole (PREVACID) 15 MG CAPSULE  "DELAYED RELEASE Take 2 Caps by mouth every day. 30 Cap    • citalopram (CELEXA) 20 MG Tab Take 1 Tab by mouth every day. 90 Tab 3   • thyroid (NP THYROID) 90 MG Tab Take 1 Tab by mouth every 48 hours. 45 Tab 3   • thyroid (NP THYROID) 60 MG Tab Take 1 Tab by mouth every 48 hours. 45 Tab 3   • triamcinolone acetonide (KENALOG) 0.1 % Cream Apply 1 g to affected area(s) 2 times a day. 1 Tube 3   • diphenhydrAMINE (BENADRYL) 25 MG Tab Take 25 mg by mouth every 6 hours as needed for Sleep.     • loratadine (CLARITIN) 10 MG Tab Take 10 mg by mouth every day.     • calcium carbonate (TUMS) 500 MG Chew Tab Take 500 mg by mouth every day.       No current facility-administered medications for this visit.        Patient Active Problem List    Diagnosis Date Noted   • Pulmonary hypertension (HCC) 10/21/2019   • Gastroesophageal reflux disease 10/21/2019   • Aortic heart murmur 09/25/2019   • Chronic respiratory failure with hypoxia (HCC) 03/13/2019   • Essential tremor 03/13/2019   • Eczema 03/13/2019   • Bilateral hearing loss 03/13/2019   • Stress 12/10/2018   • Vitamin D deficiency 09/07/2018   • Nocturnal oxygen desaturation 09/07/2018   • Arthritis 09/05/2018   • Gallbladder sludge 09/05/2018   • Varicose vein of leg 09/05/2018   • Memory loss 09/05/2018   • Hypothyroid        Family History   Problem Relation Age of Onset   • Cancer Mother         lung-smoking   • Cancer Sister         colon mets to liver   • Hypertension Sister         melanoma with mets   • Other Sister         enodmetrial cancer       She  has a past medical history of Dental infection, Hashimoto's thyroiditis, Hiatal hernia, Hypothyroid, Macular degeneration, Ocular migraine, Other specified abdominal hernia without obstruction or gangrene (9/5/2018), Post menopausal syndrome, and Reflux.  She  has a past surgical history that includes tonsillectomy and hiatal hernia repair.       Objective:   Vitals obtained by patient:  Resp 14   Ht 1.6 m (5' 3\")  "  Wt 72.6 kg (160 lb)   BMI 28.34 kg/m²     Physical Exam:  Constitutional: Alert, no distress, well-groomed.  Skin: No rashes in visible areas.  Eye: Round. Conjunctiva clear, lids normal. No icterus.   ENMT: Lips pink without lesions, good dentition, moist mucous membranes. Phonation normal.  Neck: No masses, no thyromegaly. Moves freely without pain.  CV: Pulse as reported by patient  Respiratory: Unlabored respiratory effort, no cough or audible wheeze  Psych: Alert and oriented x3, normal affect and mood.       Assessment and Plan:   The following treatment plan was discussed:     1. Chronic left-sided low back pain with left-sided sciatica  Chronic. Not improving. No incontinence of B/B. Ref to therapy. Rx for meloxicam stop Ibuprofen, take with food. Have given her exercises she can start on now to help with her back.   - REFERRAL TO PHYSICAL THERAPY Reason for Therapy: Eval/Treat/Report  - meloxicam (MOBIC) 15 MG tablet; Take 1 Tab by mouth every day.  Dispense: 90 Tab; Refill: 0  - DX-LUMBAR SPINE-2 OR 3 VIEWS; Future    2. Hypothyroidism, unspecified type  Overdue for lab work.  - CBC WITH DIFFERENTIAL; Future  - Comp Metabolic Panel; Future  - TSH WITH REFLEX TO FT4; Future    3. Immunity status testing  Was sick in earlier this year. Wants antibody test. Have reviewed implications.  - SARS CoV-2 Ab, Total; Future    4. Vitamin D deficiency  - VITAMIN D,25 HYDROXY; Future    5. Gastroesophageal reflux disease, esophagitis presence not specified  Chronic.  Controlled with current medication.  Followed by GI for this.  - lansoprazole (PREVACID) 15 MG CAPSULE DELAYED RELEASE; Take 2 Caps by mouth every day.  Dispense: 30 Cap    6. Screening for cardiovascular condition  - Lipid Profile; Future        Follow-up: No follow-ups on file.    Face to Face Video Visit:     TK Gaitan

## 2020-06-09 NOTE — LETTER
[facility]  Liss Tate A.P.R.N.  [address] [facility] [address]  [place] [address]  Fax: [phone]   Authorization for Release/Disclosure of   Protected Health Information   Name: ANGEL ZAMORA : 1940 SSN: [id number]   Address: [address] [place] [address] [address] [place] [address] Phone:    [phone] (home)    I authorize the entity listed below to release/disclose the PHI below to:   [facility]/Liss Tate, A.P.R.N. and Liss Tate A.P.R.N.   Provider or Entity Name:  [facility]   Address   City, State, [facility]   Phone:      Fax:     Reason for request: continuity of care   Information to be released:    [  ] LAST COLONOSCOPY,  including any PATH REPORT and follow-up  [  ] LAST FIT/COLOGUARD RESULT [  ] LAST DEXA  [  ] LAST MAMMOGRAM  [  ] LAST PAP  [  ] LAST LABS [  ] RETINA EXAM REPORT  [  ] IMMUNIZATION RECORDS  [ x ] Release all info      [  ] Check here and initial the line next to each item to release ALL health information INCLUDING  _____ Care and treatment for drug and / or alcohol abuse  _____ HIV testing, infection status, or AIDS  _____ Genetic Testing    DATES OF SERVICE OR TIME PERIOD TO BE DISCLOSED: _____________  I understand and acknowledge that:  * This Authorization may be revoked at any time by you in writing, except if your health information has already been used or disclosed.  * Your health information that will be used or disclosed as a result of you signing this authorization could be re-disclosed by the recipient. If this occurs, your re-disclosed health information may no longer be protected by State or Federal laws.  * You may refuse to sign this Authorization. Your refusal will not affect your ability to obtain treatment.  * This Authorization becomes effective upon signing and will  on (date) __________.      If no date is indicated, this Authorization will  one (1) year from the signature date.    Name: Angel Owen  Speedy    Signature:   Date:     6/9/2020       PLEASE FAX REQUESTED RECORDS BACK TO: [phone]

## 2020-06-10 PROBLEM — Z87.891 HISTORY OF NICOTINE USE: Status: RESOLVED | Noted: 2019-03-13 | Resolved: 2020-06-10

## 2020-06-10 PROBLEM — K20.90 ESOPHAGITIS: Status: RESOLVED | Noted: 2018-09-14 | Resolved: 2020-06-10

## 2020-06-11 ENCOUNTER — HOSPITAL ENCOUNTER (OUTPATIENT)
Dept: RADIOLOGY | Facility: MEDICAL CENTER | Age: 80
End: 2020-06-11
Attending: NURSE PRACTITIONER
Payer: MEDICARE

## 2020-06-11 ENCOUNTER — HOSPITAL ENCOUNTER (OUTPATIENT)
Dept: LAB | Facility: MEDICAL CENTER | Age: 80
End: 2020-06-11
Attending: NURSE PRACTITIONER
Payer: MEDICARE

## 2020-06-11 DIAGNOSIS — E03.9 HYPOTHYROIDISM, UNSPECIFIED TYPE: Chronic | ICD-10-CM

## 2020-06-11 DIAGNOSIS — Z13.6 SCREENING FOR CARDIOVASCULAR CONDITION: ICD-10-CM

## 2020-06-11 DIAGNOSIS — E55.9 VITAMIN D DEFICIENCY: ICD-10-CM

## 2020-06-11 DIAGNOSIS — M54.42 CHRONIC LEFT-SIDED LOW BACK PAIN WITH LEFT-SIDED SCIATICA: ICD-10-CM

## 2020-06-11 DIAGNOSIS — G89.29 CHRONIC LEFT-SIDED LOW BACK PAIN WITH LEFT-SIDED SCIATICA: ICD-10-CM

## 2020-06-11 DIAGNOSIS — Z01.84 IMMUNITY STATUS TESTING: ICD-10-CM

## 2020-06-11 LAB
25(OH)D3 SERPL-MCNC: 23 NG/ML (ref 30–100)
ALBUMIN SERPL BCP-MCNC: 4.3 G/DL (ref 3.2–4.9)
ALBUMIN/GLOB SERPL: 1.4 G/DL
ALP SERPL-CCNC: 60 U/L (ref 30–99)
ALT SERPL-CCNC: 13 U/L (ref 2–50)
ANION GAP SERPL CALC-SCNC: 10 MMOL/L (ref 7–16)
AST SERPL-CCNC: 23 U/L (ref 12–45)
BASOPHILS # BLD AUTO: 0.9 % (ref 0–1.8)
BASOPHILS # BLD: 0.04 K/UL (ref 0–0.12)
BILIRUB SERPL-MCNC: 0.3 MG/DL (ref 0.1–1.5)
BUN SERPL-MCNC: 15 MG/DL (ref 8–22)
CALCIUM SERPL-MCNC: 9.3 MG/DL (ref 8.5–10.5)
CHLORIDE SERPL-SCNC: 103 MMOL/L (ref 96–112)
CHOLEST SERPL-MCNC: 156 MG/DL (ref 100–199)
CO2 SERPL-SCNC: 25 MMOL/L (ref 20–33)
CREAT SERPL-MCNC: 0.71 MG/DL (ref 0.5–1.4)
EOSINOPHIL # BLD AUTO: 0.11 K/UL (ref 0–0.51)
EOSINOPHIL NFR BLD: 2.6 % (ref 0–6.9)
ERYTHROCYTE [DISTWIDTH] IN BLOOD BY AUTOMATED COUNT: 52.6 FL (ref 35.9–50)
GLOBULIN SER CALC-MCNC: 3.1 G/DL (ref 1.9–3.5)
GLUCOSE SERPL-MCNC: 91 MG/DL (ref 65–99)
HCT VFR BLD AUTO: 39.7 % (ref 37–47)
HDLC SERPL-MCNC: 54 MG/DL
HGB BLD-MCNC: 12.5 G/DL (ref 12–16)
IMM GRANULOCYTES # BLD AUTO: 0.01 K/UL (ref 0–0.11)
IMM GRANULOCYTES NFR BLD AUTO: 0.2 % (ref 0–0.9)
LDLC SERPL CALC-MCNC: 81 MG/DL
LYMPHOCYTES # BLD AUTO: 1.5 K/UL (ref 1–4.8)
LYMPHOCYTES NFR BLD: 35 % (ref 22–41)
MCH RBC QN AUTO: 30.4 PG (ref 27–33)
MCHC RBC AUTO-ENTMCNC: 31.5 G/DL (ref 33.6–35)
MCV RBC AUTO: 96.6 FL (ref 81.4–97.8)
MONOCYTES # BLD AUTO: 0.48 K/UL (ref 0–0.85)
MONOCYTES NFR BLD AUTO: 11.2 % (ref 0–13.4)
NEUTROPHILS # BLD AUTO: 2.15 K/UL (ref 2–7.15)
NEUTROPHILS NFR BLD: 50.1 % (ref 44–72)
NRBC # BLD AUTO: 0 K/UL
NRBC BLD-RTO: 0 /100 WBC
PLATELET # BLD AUTO: 116 K/UL (ref 164–446)
PMV BLD AUTO: 11.7 FL (ref 9–12.9)
POTASSIUM SERPL-SCNC: 4.8 MMOL/L (ref 3.6–5.5)
PROT SERPL-MCNC: 7.4 G/DL (ref 6–8.2)
RBC # BLD AUTO: 4.11 M/UL (ref 4.2–5.4)
SARS-COV-2 IGG+IGM SERPL QL IA: NORMAL
SODIUM SERPL-SCNC: 138 MMOL/L (ref 135–145)
T4 FREE SERPL-MCNC: 0.66 NG/DL (ref 0.93–1.7)
TRIGL SERPL-MCNC: 104 MG/DL (ref 0–149)
TSH SERPL DL<=0.005 MIU/L-ACNC: 5.52 UIU/ML (ref 0.38–5.33)
WBC # BLD AUTO: 4.3 K/UL (ref 4.8–10.8)

## 2020-06-11 PROCEDURE — 82306 VITAMIN D 25 HYDROXY: CPT

## 2020-06-11 PROCEDURE — 36415 COLL VENOUS BLD VENIPUNCTURE: CPT

## 2020-06-11 PROCEDURE — 84443 ASSAY THYROID STIM HORMONE: CPT

## 2020-06-11 PROCEDURE — 80053 COMPREHEN METABOLIC PANEL: CPT

## 2020-06-11 PROCEDURE — 86769 SARS-COV-2 COVID-19 ANTIBODY: CPT

## 2020-06-11 PROCEDURE — 84439 ASSAY OF FREE THYROXINE: CPT

## 2020-06-11 PROCEDURE — 72100 X-RAY EXAM L-S SPINE 2/3 VWS: CPT

## 2020-06-11 PROCEDURE — 80061 LIPID PANEL: CPT

## 2020-06-11 PROCEDURE — 85025 COMPLETE CBC W/AUTO DIFF WBC: CPT

## 2020-06-16 ENCOUNTER — TELEMEDICINE (OUTPATIENT)
Dept: MEDICAL GROUP | Facility: MEDICAL CENTER | Age: 80
End: 2020-06-16
Payer: MEDICARE

## 2020-06-16 VITALS — WEIGHT: 160 LBS | HEIGHT: 63 IN | BODY MASS INDEX: 28.35 KG/M2 | RESPIRATION RATE: 14 BRPM

## 2020-06-16 DIAGNOSIS — G89.29 CHRONIC LEFT-SIDED LOW BACK PAIN WITH LEFT-SIDED SCIATICA: ICD-10-CM

## 2020-06-16 DIAGNOSIS — E55.9 VITAMIN D DEFICIENCY: ICD-10-CM

## 2020-06-16 DIAGNOSIS — M54.42 CHRONIC LEFT-SIDED LOW BACK PAIN WITH LEFT-SIDED SCIATICA: ICD-10-CM

## 2020-06-16 DIAGNOSIS — E03.9 HYPOTHYROIDISM, UNSPECIFIED TYPE: ICD-10-CM

## 2020-06-16 PROCEDURE — 99213 OFFICE O/P EST LOW 20 MIN: CPT | Mod: 95,CR | Performed by: NURSE PRACTITIONER

## 2020-06-16 RX ORDER — THYROID 90 MG/1
90 TABLET ORAL
Qty: 90 TAB | Refills: 1 | Status: SHIPPED | OUTPATIENT
Start: 2020-06-17 | End: 2020-11-17

## 2020-06-16 RX ORDER — THYROID 60 MG/1
TABLET ORAL
Qty: 90 TAB | Refills: 1 | Status: SHIPPED | OUTPATIENT
Start: 2020-06-16 | End: 2020-11-17

## 2020-06-16 ASSESSMENT — FIBROSIS 4 INDEX: FIB4 SCORE: 4.23

## 2020-06-16 NOTE — PROGRESS NOTES
Telemedicine Video Visit: Established Patient   This Remote Face to Face encounter was conducted via Zoom. Given the importance of social distancing and other strategies recommended to reduce the risk of COVID-19 transmission, I am providing medical care to this patient via audio/video visit in place of an in person visit at the request of the patient. Verbal consent to telehealth, risks, benefits, and consequences were discussed. Patient retains the right to withdraw at any time. All existing confidentiality protections apply. The patient has access to all transmitted medical information. No dissemination of any patient images or information to other entities without further written consent.  Subjective:     Chief Complaint   Patient presents with   • Lab Results   • Other     review back xray       Anjum Ruff is a 79 y.o. female presenting for evaluation and management of:    Back Pain  Presents to follow-up on her recent imaging of her back.  Patient had been complaining of having back pain for several weeks.  States that it was more painful when she is going from a sitting to a standing position.  She complained of having a burning sensation in the left lower back.  Started on meloxicam as she states that that was helpful before.  Have reviewed imaging with patient.  Has not set up therapy just yet.    Dx -Lumbar Spine 6/11/2020  Alignment: Posterior subluxations at L1-2 and L2-3 measuring 5 mm and 3 mm respectively.   There is mild scoliosis being and levoconvex at the thoracolumbar junction and minimally dextroconvex in the lumbar spine.   There is endplate spurring and facet arthropathy.   The visualized portions of the abdomen are within normal limits.     IMPRESSION:     1.  Degenerative subluxations at L1-2 and L2-3 measuring 5 mm and 3 mm respectively.     2.  Multilevel facet arthropathy     3.  Mild scoliosis      Hypothyroid  Most recent TSH level at 5.520 and T4 at 0.66.  Patient does  "take NP thyroid for this. Has had some weight gain.    Vitamin D deficiency   Most recent vitamin D level at 23.  Has not been consistent with taking her multivitamin.      ROS back pain  Denies any recent fevers or chills. No nausea or vomiting. No chest pains or shortness of breath.     Allergies   Allergen Reactions   • Vitamin D      On loading dose-\"sick to her stomach\"       Current medicines (including changes today)  Current Outpatient Medications   Medication Sig Dispense Refill   • [START ON 6/17/2020] thyroid (NP THYROID) 90 MG Tab Take 1 Tab by mouth every Monday, Wednesday, Friday, and Saturday. 90 Tab 1   • thyroid (NP THYROID) 60 MG Tab Take one tablet by mouth Tuesday, Thursday and Sunday. 90 Tab 1   • meloxicam (MOBIC) 15 MG tablet Take 1 Tab by mouth every day. 90 Tab 0   • lansoprazole (PREVACID) 15 MG CAPSULE DELAYED RELEASE Take 2 Caps by mouth every day. 30 Cap    • citalopram (CELEXA) 20 MG Tab Take 1 Tab by mouth every day. 90 Tab 3   • triamcinolone acetonide (KENALOG) 0.1 % Cream Apply 1 g to affected area(s) 2 times a day. 1 Tube 3   • diphenhydrAMINE (BENADRYL) 25 MG Tab Take 25 mg by mouth every 6 hours as needed for Sleep.     • loratadine (CLARITIN) 10 MG Tab Take 10 mg by mouth every day.     • calcium carbonate (TUMS) 500 MG Chew Tab Take 500 mg by mouth every day.       No current facility-administered medications for this visit.        Patient Active Problem List    Diagnosis Date Noted   • Pulmonary hypertension (HCC) 10/21/2019   • Gastroesophageal reflux disease 10/21/2019   • Aortic heart murmur 09/25/2019   • Chronic respiratory failure with hypoxia ([facility]) 03/13/2019   • Essential tremor 03/13/2019   • Eczema 03/13/2019   • Bilateral hearing loss 03/13/2019   • Stress 12/10/2018   • Vitamin D deficiency 09/07/2018   • Nocturnal oxygen desaturation 09/07/2018   • Arthritis 09/05/2018   • Gallbladder sludge 09/05/2018   • Varicose vein of leg 09/05/2018   • Memory loss 09/05/2018 " "  • Hypothyroid        Family History   Problem Relation Age of Onset   • Cancer Mother         lung-smoking   • Cancer Sister         colon mets to liver   • Hypertension Sister         melanoma with mets   • Other Sister         enodmetrial cancer       She  has a past medical history of Dental infection, Hashimoto's thyroiditis, Hiatal hernia, Hypothyroid, Macular degeneration, Ocular migraine, Other specified abdominal hernia without obstruction or gangrene (9/5/2018), Post menopausal syndrome, and Reflux.  She  has a past surgical history that includes tonsillectomy and hiatal hernia repair.       Objective:   Vitals obtained by patient:  Resp 14   Ht 1.6 m (5' 3\")   Wt 72.6 kg (160 lb)   BMI 28.34 kg/m²     Physical Exam:  Constitutional: Alert, no distress, well-groomed.  Skin: No rashes in visible areas.  Eye: Round. Conjunctiva clear, lids normal. No icterus.   ENMT: Lips pink without lesions, good dentition, moist mucous membranes. Phonation normal.  Neck: No masses, no thyromegaly. Moves freely without pain.  CV: Pulse as reported by patient  Respiratory: Unlabored respiratory effort, no cough or audible wheeze  Psych: Alert and oriented x3, normal affect and mood.       Assessment and Plan:   The following treatment plan was discussed:     1. Chronic left-sided low back pain with left-sided sciatica  Multilevel facet degeneration, subluxation L1-L2, L2-L3.  Reviewed imaging with patient.  Recommend continue with meloxicam taking with food and follow-up with physical therapy.     2. Hypothyroidism, unspecified type  TSH slightly on the higher end.  Patient is having a weight gain and fatigue.  Will change NP thyroid 90 four days a week and the NP thyroid 60 three days a week.  We have discussed possibly changing over to synthetic levothyroxine.  Follow-up labs 6 weeks.  - thyroid (NP THYROID) 60 MG Tab; Take one tablet by mouth Tuesday, Thursday and Sunday.  Dispense: 90 Tab; Refill: 1  - thyroid (NP " THYROID) 90 MG Tab; Take 1 Tab by mouth every Monday, Wednesday, Friday, and Saturday.  Dispense: 90 Tab; Refill: 1  - TSH WITH REFLEX TO FT4; Future    3. Vitamin D deficiency  Suboptimal on most recent labs.  Recommend taking her over-the-counter vitamin D supplement.      Follow-up: Return if symptoms worsen or fail to improve.    Face to Face Video Visit:     TK Gaitan

## 2020-11-16 DIAGNOSIS — E03.9 HYPOTHYROIDISM, UNSPECIFIED TYPE: ICD-10-CM

## 2020-11-17 RX ORDER — LEVOTHYROXINE, LIOTHYRONINE 57; 13.5 UG/1; UG/1
TABLET ORAL
Qty: 90 TAB | Refills: 2 | Status: SHIPPED | OUTPATIENT
Start: 2020-11-17 | End: 2021-10-21

## 2020-11-17 RX ORDER — LEVOTHYROXINE, LIOTHYRONINE 38; 9 UG/1; UG/1
TABLET ORAL
Qty: 90 TAB | Refills: 2 | Status: SHIPPED | OUTPATIENT
Start: 2020-11-17 | End: 2021-10-21

## 2021-01-11 DIAGNOSIS — Z23 NEED FOR VACCINATION: ICD-10-CM

## 2021-04-07 ENCOUNTER — IMMUNIZATION (OUTPATIENT)
Dept: FAMILY PLANNING/WOMEN'S HEALTH CLINIC | Facility: IMMUNIZATION CENTER | Age: 81
End: 2021-04-07
Attending: INTERNAL MEDICINE
Payer: MEDICARE

## 2021-04-07 DIAGNOSIS — Z23 NEED FOR VACCINATION: ICD-10-CM

## 2021-04-07 DIAGNOSIS — Z23 ENCOUNTER FOR VACCINATION: Primary | ICD-10-CM

## 2021-04-07 PROCEDURE — 0001A PFIZER SARS-COV-2 VACCINE: CPT | Performed by: NURSE PRACTITIONER

## 2021-04-07 PROCEDURE — 91300 PFIZER SARS-COV-2 VACCINE: CPT | Performed by: NURSE PRACTITIONER

## 2021-04-08 DIAGNOSIS — F43.9 STRESS: ICD-10-CM

## 2021-04-09 RX ORDER — CITALOPRAM 20 MG/1
TABLET ORAL
Qty: 90 TABLET | Refills: 1 | Status: SHIPPED | OUTPATIENT
Start: 2021-04-09 | End: 2021-10-14

## 2021-05-07 ENCOUNTER — IMMUNIZATION (OUTPATIENT)
Dept: FAMILY PLANNING/WOMEN'S HEALTH CLINIC | Facility: IMMUNIZATION CENTER | Age: 81
End: 2021-05-07
Payer: MEDICARE

## 2021-05-07 DIAGNOSIS — Z23 ENCOUNTER FOR VACCINATION: Primary | ICD-10-CM

## 2021-05-07 PROCEDURE — 91300 PFIZER SARS-COV-2 VACCINE: CPT

## 2021-05-07 PROCEDURE — 0002A PFIZER SARS-COV-2 VACCINE: CPT

## 2021-09-28 ENCOUNTER — TELEMEDICINE (OUTPATIENT)
Dept: MEDICAL GROUP | Facility: MEDICAL CENTER | Age: 81
End: 2021-09-28
Payer: MEDICARE

## 2021-09-28 DIAGNOSIS — E55.9 VITAMIN D DEFICIENCY: ICD-10-CM

## 2021-09-28 DIAGNOSIS — Z12.31 ENCOUNTER FOR SCREENING MAMMOGRAM FOR BREAST CANCER: ICD-10-CM

## 2021-09-28 DIAGNOSIS — E03.4 HYPOTHYROIDISM DUE TO ACQUIRED ATROPHY OF THYROID: Chronic | ICD-10-CM

## 2021-09-28 DIAGNOSIS — Z12.11 SCREEN FOR COLON CANCER: ICD-10-CM

## 2021-09-28 DIAGNOSIS — M54.16 LUMBAR RADICULOPATHY: ICD-10-CM

## 2021-09-28 DIAGNOSIS — Z78.0 POSTMENOPAUSAL: ICD-10-CM

## 2021-09-28 DIAGNOSIS — E78.5 DYSLIPIDEMIA: ICD-10-CM

## 2021-09-28 PROBLEM — H35.30 MACULAR DEGENERATION OF LEFT EYE: Status: ACTIVE | Noted: 2021-09-28

## 2021-09-28 PROCEDURE — 99442 PR PHYSICIAN TELEPHONE EVALUATION 11-20 MIN: CPT | Performed by: NURSE PRACTITIONER

## 2021-09-28 ASSESSMENT — PATIENT HEALTH QUESTIONNAIRE - PHQ9: CLINICAL INTERPRETATION OF PHQ2 SCORE: 0

## 2021-09-28 ASSESSMENT — ENCOUNTER SYMPTOMS: GENERAL WELL-BEING: GOOD

## 2021-09-28 ASSESSMENT — ACTIVITIES OF DAILY LIVING (ADL): BATHING_REQUIRES_ASSISTANCE: 0

## 2021-09-28 NOTE — PROGRESS NOTES
As a means of avoiding spread of COVID-19, this visit is being conducted by telephone. This telephone visit was initiated by the patient and they verbally consented.    Time at start of call: 10:50    Reason for Call: Update labs for thyroid and chronic back pain.    Anjum Lexie Ruff via PHONE VISIT to discuss:  Unable to perform via VIDEO due to unable to access the video portion on her resume.    Last visit was a video visit in June 2020.    1. Hypothyroidism due to acquired atrophy of thyroid  Taking NP thyroid for this.  Previously her TSH was on the higher end, however did not complete recheck of TSH. She is feeling tired, but attributes to her age. No constipation or weight gain. Might have lost some weight.  We had previously discussed changing over to synthetic levothyroxine as this is more effectively managed.    2. Lumbar radiculopathy  Previous imaging with multilevel facet degeneration, subluxation at  L1-L2, L2-L3.   Taking as needed Meloxicam. Her pain is bad as night. Her right leg is achy at night. Having radiating pain from lower back to right lateral thigh-stopping at the knee. She sits all day-she does work part-time.  States that movement does help her pain.  States physical therapy was helpful for her.  Her Pilates machine can be helpful has not done that in quite some time.  Denies any loss of bowel or bladder, saddle anesthesia..    3. Dyslipidemia  Not on any medication at this time.    4. Vitamin D deficiency  Historically present.  Taking OTC for this.    5. Postmenopausal  Due for bone density.    6. Encounter for screening mammogram for breast cancer  Due for mammogram    7. Screen for colon cancer  Due for colon cancer screening.       There were no vitals filed for this visit.    Patient Active Problem List    Diagnosis Date Noted   • Macular degeneration of left eye 09/28/2021   • Pulmonary hypertension (HCC) 10/21/2019   • Gastroesophageal reflux disease 10/21/2019   •  Aortic heart murmur 09/25/2019   • Chronic respiratory failure with hypoxia (HCC) 03/13/2019   • Essential tremor 03/13/2019   • Eczema 03/13/2019   • Bilateral hearing loss 03/13/2019   • Stress 12/10/2018   • Vitamin D deficiency 09/07/2018   • Nocturnal oxygen desaturation 09/07/2018   • Arthritis 09/05/2018   • Gallbladder sludge 09/05/2018   • Varicose vein of leg 09/05/2018   • Memory loss 09/05/2018   • Hypothyroid        Current Outpatient Medications   Medication Sig Dispense Refill   • citalopram (CELEXA) 20 MG Tab Take 1 tablet by mouth once daily 90 tablet 1   • NP THYROID 60 MG Tab TAKE 1 TABLET BY MOUTH EVERY 48 HOURS 90 Tab 2   • NP THYROID 90 MG Tab TAKE 1 TABLET BY MOUTH EVERY 48 HOURS ** NEEDS LABS BEFORE MORE REFILLS** 90 Tab 2   • meloxicam (MOBIC) 15 MG tablet Take 1 Tab by mouth every day. 90 Tab 0   • lansoprazole (PREVACID) 15 MG CAPSULE DELAYED RELEASE Take 2 Caps by mouth every day. 30 Cap    • triamcinolone acetonide (KENALOG) 0.1 % Cream Apply 1 g to affected area(s) 2 times a day. 1 Tube 3   • diphenhydrAMINE (BENADRYL) 25 MG Tab Take 25 mg by mouth every 6 hours as needed for Sleep.     • loratadine (CLARITIN) 10 MG Tab Take 10 mg by mouth every day.     • calcium carbonate (TUMS) 500 MG Chew Tab Take 500 mg by mouth every day.       No current facility-administered medications for this visit.       Assessment and plan:    1. Hypothyroidism due to acquired atrophy of thyroid  Overdue for follow-up blood work.  Previous TSH was elevated.  Follow-up after labs and consider changing to levothyroxine.  - Comp Metabolic Panel; Future  - TSH WITH REFLEX TO FT4; Future    2. Lumbar radiculopathy  Chronic, stable.  PT was helpful in the past however symptoms have somewhat worsened a bit.  Having radiculopathy.  Have ordered MRI to evaluate degree of spondylolisthesis, DDD, central canal stenosis and foraminal stenosis.  - MR-LUMBAR SPINE-W/O; Future    3. Dyslipidemia  Not on statin therapy.   Manages with lifestyle changes.  - Lipid Profile; Future    4. Vitamin D deficiency  Continue OTC for this.  - VITAMIN D,25 HYDROXY; Future    5. Postmenopausal    - DS-BONE DENSITY STUDY (DEXA); Future    6. Encounter for screening mammogram for breast cancer    - MA-SCREENING MAMMO BILAT W/TOMOSYNTHESIS W/CAD; Future    7. Screen for colon cancer    - REFERRAL TO GI FOR COLONOSCOPY       Follow up after labs      Time at end of call: 1107    Total Time Spent: 11-20 minutes      Liss TRINIDAD

## 2021-10-08 ENCOUNTER — HOSPITAL ENCOUNTER (OUTPATIENT)
Dept: LAB | Facility: MEDICAL CENTER | Age: 81
End: 2021-10-08
Attending: NURSE PRACTITIONER
Payer: MEDICARE

## 2021-10-08 DIAGNOSIS — E03.4 HYPOTHYROIDISM DUE TO ACQUIRED ATROPHY OF THYROID: Chronic | ICD-10-CM

## 2021-10-08 DIAGNOSIS — E55.9 VITAMIN D DEFICIENCY: ICD-10-CM

## 2021-10-08 DIAGNOSIS — E78.5 DYSLIPIDEMIA: ICD-10-CM

## 2021-10-08 LAB
25(OH)D3 SERPL-MCNC: 49 NG/ML (ref 30–100)
ALBUMIN SERPL BCP-MCNC: 4.4 G/DL (ref 3.2–4.9)
ALBUMIN/GLOB SERPL: 1.4 G/DL
ALP SERPL-CCNC: 73 U/L (ref 30–99)
ALT SERPL-CCNC: 16 U/L (ref 2–50)
ANION GAP SERPL CALC-SCNC: 8 MMOL/L (ref 7–16)
AST SERPL-CCNC: 21 U/L (ref 12–45)
BILIRUB SERPL-MCNC: 0.4 MG/DL (ref 0.1–1.5)
BUN SERPL-MCNC: 12 MG/DL (ref 8–22)
CALCIUM SERPL-MCNC: 9.4 MG/DL (ref 8.5–10.5)
CHLORIDE SERPL-SCNC: 102 MMOL/L (ref 96–112)
CHOLEST SERPL-MCNC: 162 MG/DL (ref 100–199)
CO2 SERPL-SCNC: 28 MMOL/L (ref 20–33)
CREAT SERPL-MCNC: 0.63 MG/DL (ref 0.5–1.4)
FASTING STATUS PATIENT QL REPORTED: NORMAL
GLOBULIN SER CALC-MCNC: 3.1 G/DL (ref 1.9–3.5)
GLUCOSE SERPL-MCNC: 87 MG/DL (ref 65–99)
HDLC SERPL-MCNC: 59 MG/DL
LDLC SERPL CALC-MCNC: 78 MG/DL
POTASSIUM SERPL-SCNC: 4.1 MMOL/L (ref 3.6–5.5)
PROT SERPL-MCNC: 7.5 G/DL (ref 6–8.2)
SODIUM SERPL-SCNC: 138 MMOL/L (ref 135–145)
T4 FREE SERPL-MCNC: 0.81 NG/DL (ref 0.93–1.7)
TRIGL SERPL-MCNC: 126 MG/DL (ref 0–149)
TSH SERPL DL<=0.005 MIU/L-ACNC: 8.36 UIU/ML (ref 0.38–5.33)

## 2021-10-08 PROCEDURE — 84443 ASSAY THYROID STIM HORMONE: CPT

## 2021-10-08 PROCEDURE — 80053 COMPREHEN METABOLIC PANEL: CPT

## 2021-10-08 PROCEDURE — 82306 VITAMIN D 25 HYDROXY: CPT

## 2021-10-08 PROCEDURE — 80061 LIPID PANEL: CPT

## 2021-10-08 PROCEDURE — 36415 COLL VENOUS BLD VENIPUNCTURE: CPT

## 2021-10-08 PROCEDURE — 84439 ASSAY OF FREE THYROXINE: CPT

## 2021-10-13 DIAGNOSIS — F43.9 STRESS: ICD-10-CM

## 2021-10-14 RX ORDER — CITALOPRAM 20 MG/1
TABLET ORAL
Qty: 90 TABLET | Refills: 3 | Status: SHIPPED | OUTPATIENT
Start: 2021-10-14 | End: 2022-10-09

## 2021-10-21 ENCOUNTER — OFFICE VISIT (OUTPATIENT)
Dept: MEDICAL GROUP | Facility: MEDICAL CENTER | Age: 81
End: 2021-10-21
Payer: MEDICARE

## 2021-10-21 VITALS
OXYGEN SATURATION: 96 % | SYSTOLIC BLOOD PRESSURE: 114 MMHG | HEIGHT: 64 IN | BODY MASS INDEX: 25.93 KG/M2 | HEART RATE: 76 BPM | DIASTOLIC BLOOD PRESSURE: 68 MMHG | TEMPERATURE: 98.1 F | WEIGHT: 151.9 LBS

## 2021-10-21 DIAGNOSIS — Z23 NEED FOR VACCINATION: ICD-10-CM

## 2021-10-21 DIAGNOSIS — H35.30 MACULAR DEGENERATION OF LEFT EYE, UNSPECIFIED TYPE: ICD-10-CM

## 2021-10-21 DIAGNOSIS — J96.11 CHRONIC RESPIRATORY FAILURE WITH HYPOXIA (HCC): ICD-10-CM

## 2021-10-21 DIAGNOSIS — E03.4 HYPOTHYROIDISM DUE TO ACQUIRED ATROPHY OF THYROID: ICD-10-CM

## 2021-10-21 DIAGNOSIS — I27.20 PULMONARY HYPERTENSION (HCC): Chronic | ICD-10-CM

## 2021-10-21 PROBLEM — G47.34 NOCTURNAL OXYGEN DESATURATION: Chronic | Status: RESOLVED | Noted: 2018-09-07 | Resolved: 2021-10-21

## 2021-10-21 PROBLEM — Z97.4 USES HEARING AID: Status: ACTIVE | Noted: 2019-03-13

## 2021-10-21 PROBLEM — K21.9 GASTROESOPHAGEAL REFLUX DISEASE: Chronic | Status: ACTIVE | Noted: 2019-10-21

## 2021-10-21 PROBLEM — Z97.4 USES HEARING AID: Chronic | Status: ACTIVE | Noted: 2019-03-13

## 2021-10-21 PROBLEM — G25.0 ESSENTIAL TREMOR: Chronic | Status: ACTIVE | Noted: 2019-03-13

## 2021-10-21 PROBLEM — R41.3 MEMORY LOSS: Chronic | Status: ACTIVE | Noted: 2018-09-05

## 2021-10-21 PROBLEM — E55.9 VITAMIN D DEFICIENCY: Chronic | Status: ACTIVE | Noted: 2018-09-07

## 2021-10-21 PROCEDURE — 99214 OFFICE O/P EST MOD 30 MIN: CPT | Mod: 25 | Performed by: NURSE PRACTITIONER

## 2021-10-21 PROCEDURE — 90662 IIV NO PRSV INCREASED AG IM: CPT | Performed by: NURSE PRACTITIONER

## 2021-10-21 PROCEDURE — G0008 ADMIN INFLUENZA VIRUS VAC: HCPCS | Performed by: NURSE PRACTITIONER

## 2021-10-21 RX ORDER — LEVOTHYROXINE SODIUM 0.1 MG/1
50 TABLET ORAL
Qty: 30 TABLET | Refills: 3 | Status: SHIPPED | OUTPATIENT
Start: 2021-10-21 | End: 2021-10-22

## 2021-10-21 ASSESSMENT — FIBROSIS 4 INDEX: FIB4 SCORE: 3.53

## 2021-10-21 NOTE — LETTER
[facility]  Liss Tate A.P.R.N.  [address] [facility] [address]  [place] [address]  Fax: [phone]   Authorization for Release/Disclosure of   Protected Health Information   Name: ANGEL ZAMORA : 1940 SSN: [id number]   Address: [address] [place] [address] [place] [address] Phone:    [phone] (home)    I authorize the entity listed below to release/disclose the PHI below to:   [facility]/Liss Tate, A.P.R.N. and Liss Tate A.P.R.N.   Provider or Entity Name:  [facility]   Address   City, State, [facility]   Phone:      Fax:     Reason for request: continuity of care   Information to be released:    [x  ] LAST COLONOSCOPY,  including any PATH REPORT and follow-up  [  ] LAST FIT/COLOGUARD RESULT [  ] LAST DEXA  [  ] LAST MAMMOGRAM  [  ] LAST PAP  [  ] LAST LABS [  ] RETINA EXAM REPORT  [  ] IMMUNIZATION RECORDS  [  ] Release all info  (x) EGD      [  ] Check here and initial the line next to each item to release ALL health information INCLUDING  _____ Care and treatment for drug and / or alcohol abuse  _____ HIV testing, infection status, or AIDS  _____ Genetic Testing    DATES OF SERVICE OR TIME PERIOD TO BE DISCLOSED: _____________  I understand and acknowledge that:  * This Authorization may be revoked at any time by you in writing, except if your health information has already been used or disclosed.  * Your health information that will be used or disclosed as a result of you signing this authorization could be re-disclosed by the recipient. If this occurs, your re-disclosed health information may no longer be protected by State or Federal laws.  * You may refuse to sign this Authorization. Your refusal will not affect your ability to obtain treatment.  * This Authorization becomes effective upon signing and will  on (date) __________.      If no date is indicated, this Authorization will  one (1) year from the signature date.    Name: Angel Owen  Speedy    Signature:   Date:     10/21/2021       PLEASE FAX REQUESTED RECORDS BACK TO: [phone]

## 2021-10-21 NOTE — LETTER
[facility]  Liss Tate A.P.R.N.  [address] [facility] [address]  [place] [address]  Fax: [phone]   Authorization for Release/Disclosure of   Protected Health Information   Name: ANGEL ZAMORA : 1940 SSN: [id number]   Address: [address] [place] [address] [place] [address] Phone:    [phone] (home)    I authorize the entity listed below to release/disclose the PHI below to:   [facility]/Liss Tate, A.P.R.N. and Liss Tate A.P.R.N.   Provider or Entity Name:  Dr. Giles   Address   City, State, [facility]   Phone:      Fax:     Reason for request: continuity of care   Information to be released:    [  ] LAST COLONOSCOPY,  including any PATH REPORT and follow-up  [  ] LAST FIT/COLOGUARD RESULT [  ] LAST DEXA  [  ] LAST MAMMOGRAM  [  ] LAST PAP  [  ] LAST LABS [  ] RETINA EXAM REPORT  [  ] IMMUNIZATION RECORDS  [  ] Release all info      [  ] Check here and initial the line next to each item to release ALL health information INCLUDING  _____ Care and treatment for drug and / or alcohol abuse  _____ HIV testing, infection status, or AIDS  _____ Genetic Testing    DATES OF SERVICE OR TIME PERIOD TO BE DISCLOSED: _____________  I understand and acknowledge that:  * This Authorization may be revoked at any time by you in writing, except if your health information has already been used or disclosed.  * Your health information that will be used or disclosed as a result of you signing this authorization could be re-disclosed by the recipient. If this occurs, your re-disclosed health information may no longer be protected by State or Federal laws.  * You may refuse to sign this Authorization. Your refusal will not affect your ability to obtain treatment.  * This Authorization becomes effective upon signing and will  on (date) __________.      If no date is indicated, this Authorization will  one (1) year from the signature date.    Name: Angel Owen  Speedy    Signature:   Date:     10/21/2021       PLEASE FAX REQUESTED RECORDS BACK TO: [phone]

## 2021-10-21 NOTE — PROGRESS NOTES
Chief Complaint   Patient presents with   • Lab Results     DOS: 10/8/2021       Subjective:     HPI:     Anjum Ruff is a 80 y.o. female here to discuss the evaluation and management of:    Patient presents today to review labs.    Hypothyroid  Patient has been taking NP thyroid for this it.  Her thyroid labs have been difficult to stabilize.  At this time recommend starting on synthetic levothyroxine.  Patient is amenable to this.  She does complain of fatigue.   Ref. Range 10/8/2021 07:26   TSH Latest Ref Range: 0.380 - 5.330 uIU/mL 8.360 (H)   Free T-4 Latest Ref Range: 0.93 - 1.70 ng/dL 0.81 (L)     Chronic respiratory failure with hypoxia   Continues to use 2 L at night.     Macular degeneration Left eye  Followed by [facility]. No injections at this time. Just surveillence.     Pulmonary hypertension (HCC)  Found on recent Echo. RSVP at 36mmHg. LEF at 65. No valve abnormalities.  Previously declined follow-up with pulmonology.    Will have EGD and colonoscopy in December.   She mentions that she has hearing aids at this time.    ROS:  Denies any Headache, Blurred Vision, Confusion, Chest pain,  Shortness of breath,  Abdominal pain, Changes of bowel or bladder, Lower ext edema, Fevers, Nights sweats, Weight Changes, Focal weakness or numbness.  And all other systems reviewed and are all negative. POSITIVE FOR : see above        Current Outpatient Medications:   •  levothyroxine (SYNTHROID) 100 MCG Tab, Take 1 Tablet by mouth every morning on an empty stomach., Disp: 30 Tablet, Rfl: 3  •  citalopram (CELEXA) 20 MG Tab, Take 1 tablet by mouth once daily, Disp: 90 Tablet, Rfl: 3  •  meloxicam (MOBIC) 15 MG tablet, Take 1 Tab by mouth every day., Disp: 90 Tab, Rfl: 0  •  lansoprazole (PREVACID) 15 MG CAPSULE DELAYED RELEASE, Take 2 Caps by mouth every day., Disp: 30 Cap, Rfl:   •  triamcinolone acetonide (KENALOG) 0.1 % Cream, Apply 1 g to affected area(s) 2 times a day., Disp: 1 Tube, Rfl:  "3  •  diphenhydrAMINE (BENADRYL) 25 MG Tab, Take 25 mg by mouth every 6 hours as needed for Sleep., Disp: , Rfl:   •  calcium carbonate (TUMS) 500 MG Chew Tab, Take 500 mg by mouth every day., Disp: , Rfl:     Allergies   Allergen Reactions   • Vitamin D      On loading dose-\"sick to her stomach\"       Past Medical History:   Diagnosis Date   • Dental infection     waiting for dental infection.   • Hashimoto's thyroiditis    • Hiatal hernia    • Hypothyroid    • Macular degeneration    • Ocular migraine    • Other specified abdominal hernia without obstruction or gangrene 9/5/2018   • Post menopausal syndrome    • Reflux      Past Surgical History:   Procedure Laterality Date   • HIATAL HERNIA REPAIR     • TONSILLECTOMY       Family History   Problem Relation Age of Onset   • Cancer Mother         lung-smoking   • Cancer Sister         colon mets to liver   • Hypertension Sister         melanoma with mets   • Other Sister         enodmetrial cancer     Social History     Socioeconomic History   • Marital status:      Spouse name: Not on file   • Number of children: Not on file   • Years of education: Not on file   • Highest education level: Not on file   Occupational History   • Not on file   Tobacco Use   • Smoking status: Former Smoker   • Smokeless tobacco: Never Used   Substance and Sexual Activity   • Alcohol use: Yes     Comment: occ   • Drug use: No   • Sexual activity: Yes     Partners: Male   Other Topics Concern   • Not on file   Social History Narrative   • Not on file     Social Determinants of Health     Financial Resource Strain:    • Difficulty of Paying Living Expenses:    Food Insecurity:    • Worried About Running Out of Food in the Last Year:    • Ran Out of Food in the Last Year:    Transportation Needs:    • Lack of Transportation (Medical):    • Lack of Transportation (Non-Medical):    Physical Activity:    • Days of Exercise per Week:    • Minutes of Exercise per Session:    Stress:  " "  • Feeling of Stress :    Social Connections:    • Frequency of Communication with Friends and Family:    • Frequency of Social Gatherings with Friends and Family:    • Attends Church Services:    • Active Member of Clubs or Organizations:    • Attends Club or Organization Meetings:    • Marital Status:    Intimate Partner Violence:    • Fear of Current or Ex-Partner:    • Emotionally Abused:    • Physically Abused:    • Sexually Abused:        Objective:     Vitals: /68 (BP Location: Right arm, Patient Position: Sitting, BP Cuff Size: Adult)   Pulse 76   Temp 36.7 °C (98.1 °F) (Temporal)   Ht 1.626 m (5' 4\")   Wt 68.9 kg (151 lb 14.4 oz)   SpO2 96%   BMI 26.07 kg/m²    General: Alert, pleasant, NAD  HEENT: Normocephalic.  Neck supple.   Skin: Warm, dry, no rashes.  Extremities: No leg edema. No discoloration  Neurological: No tremors  Psych:  Affect/mood is normal, judgement is good, memory is intact, grooming is appropriate.    Assessment/Plan:     Anjum was seen today for lab results.    Diagnoses and all orders for this visit:    Hypothyroidism due to acquired atrophy of thyroid  Not well controlled.  Stop NP thyroid and start on levothyroxine 100 mcg daily, recheck TSH every 6 weeks until TSH is stabilized.  -     levothyroxine (SYNTHROID) 100 MCG Tab; Take 1 Tablets by mouth every morning on an empty stomach.  -     TSH WITH REFLEX TO FT4; Standing    Chronic respiratory failure with hypoxia (HCC)  Chronic.  Continue to use supplemental oxygen at nighttime.    Macular degeneration of left eye, unspecified type  Stable not requiring any injections at this time.  Continue to follow-up with eye care specialist for this.    Pulmonary hypertension (HCC)  Known problem.  Last echo with RVSP at 36 mmHg.  Declined pulmonology referral.  Continue surveillance.    Need for vaccination  -     Influenza Vaccine, High Dose (65+ Only)    Return in about 6 months (around 4/21/2022).    {I have placed the " above orders and discussed them with an approved delegating provider.  The MA is performing the below orders under the direction of Dr. Navjot FREY

## 2021-10-22 RX ORDER — LEVOTHYROXINE SODIUM 0.1 MG/1
100 TABLET ORAL
Qty: 30 TABLET | Refills: 3 | Status: SHIPPED | OUTPATIENT
Start: 2021-10-22 | End: 2022-03-02

## 2021-11-19 ENCOUNTER — HOSPITAL ENCOUNTER (OUTPATIENT)
Dept: LAB | Facility: MEDICAL CENTER | Age: 81
End: 2021-11-19
Attending: NURSE PRACTITIONER
Payer: MEDICARE

## 2021-11-19 DIAGNOSIS — E03.4 HYPOTHYROIDISM DUE TO ACQUIRED ATROPHY OF THYROID: ICD-10-CM

## 2021-11-19 LAB
T4 FREE SERPL-MCNC: 0.94 NG/DL (ref 0.93–1.7)
TSH SERPL DL<=0.005 MIU/L-ACNC: 11.3 UIU/ML (ref 0.38–5.33)

## 2021-11-19 PROCEDURE — 84439 ASSAY OF FREE THYROXINE: CPT

## 2021-11-19 PROCEDURE — 36415 COLL VENOUS BLD VENIPUNCTURE: CPT

## 2021-11-19 PROCEDURE — 84443 ASSAY THYROID STIM HORMONE: CPT

## 2021-11-24 DIAGNOSIS — E03.4 HYPOTHYROIDISM DUE TO ACQUIRED ATROPHY OF THYROID: ICD-10-CM

## 2021-12-02 ENCOUNTER — TELEPHONE (OUTPATIENT)
Dept: MEDICAL GROUP | Facility: MEDICAL CENTER | Age: 81
End: 2021-12-02

## 2021-12-02 NOTE — TELEPHONE ENCOUNTER
Pt called stating that the Rx Levothyroxine might not be helping her as she states that she has been taking 100mcg 1/2 tab QD. After reviewing PCP's note, it inquires of pt of needing to take the full tablet of 100mcg QD. I advised pt to take the full tab instead to see if that would help her and will be advising PCP abt this if its appropriate.

## 2022-01-28 ENCOUNTER — HOSPITAL ENCOUNTER (OUTPATIENT)
Dept: LAB | Facility: MEDICAL CENTER | Age: 82
End: 2022-01-28
Attending: NURSE PRACTITIONER
Payer: MEDICARE

## 2022-01-28 DIAGNOSIS — E03.4 HYPOTHYROIDISM DUE TO ACQUIRED ATROPHY OF THYROID: ICD-10-CM

## 2022-01-28 LAB
T4 FREE SERPL-MCNC: 1.2 NG/DL (ref 0.93–1.7)
TSH SERPL DL<=0.005 MIU/L-ACNC: 2.41 UIU/ML (ref 0.38–5.33)

## 2022-01-28 PROCEDURE — 36415 COLL VENOUS BLD VENIPUNCTURE: CPT

## 2022-01-28 PROCEDURE — 84439 ASSAY OF FREE THYROXINE: CPT

## 2022-01-28 PROCEDURE — 84443 ASSAY THYROID STIM HORMONE: CPT

## 2022-03-01 DIAGNOSIS — E03.4 HYPOTHYROIDISM DUE TO ACQUIRED ATROPHY OF THYROID: ICD-10-CM

## 2022-03-02 RX ORDER — LEVOTHYROXINE SODIUM 100 UG/1
TABLET ORAL
Qty: 30 TABLET | Refills: 6 | Status: SHIPPED | OUTPATIENT
Start: 2022-03-02 | End: 2022-04-05 | Stop reason: SDUPTHER

## 2022-03-17 ENCOUNTER — HOSPITAL ENCOUNTER (OUTPATIENT)
Dept: LAB | Facility: MEDICAL CENTER | Age: 82
End: 2022-03-17
Attending: NURSE PRACTITIONER
Payer: MEDICARE

## 2022-03-17 ENCOUNTER — HOSPITAL ENCOUNTER (OUTPATIENT)
Dept: RADIOLOGY | Facility: MEDICAL CENTER | Age: 82
End: 2022-03-17
Attending: NURSE PRACTITIONER
Payer: MEDICARE

## 2022-03-17 DIAGNOSIS — Z12.31 ENCOUNTER FOR SCREENING MAMMOGRAM FOR BREAST CANCER: ICD-10-CM

## 2022-03-17 DIAGNOSIS — E03.4 HYPOTHYROIDISM DUE TO ACQUIRED ATROPHY OF THYROID: ICD-10-CM

## 2022-03-17 LAB — TSH SERPL DL<=0.005 MIU/L-ACNC: 3.28 UIU/ML (ref 0.38–5.33)

## 2022-03-17 PROCEDURE — 77063 BREAST TOMOSYNTHESIS BI: CPT

## 2022-03-17 PROCEDURE — 84443 ASSAY THYROID STIM HORMONE: CPT

## 2022-03-17 PROCEDURE — 36415 COLL VENOUS BLD VENIPUNCTURE: CPT

## 2022-04-05 ENCOUNTER — TELEPHONE (OUTPATIENT)
Dept: MEDICAL GROUP | Facility: MEDICAL CENTER | Age: 82
End: 2022-04-05
Payer: MEDICARE

## 2022-04-05 DIAGNOSIS — E03.4 HYPOTHYROIDISM DUE TO ACQUIRED ATROPHY OF THYROID: ICD-10-CM

## 2022-04-05 RX ORDER — LEVOTHYROXINE SODIUM 0.1 MG/1
100 TABLET ORAL
Qty: 90 TABLET | Refills: 2 | Status: SHIPPED | OUTPATIENT
Start: 2022-04-05 | End: 2022-09-20

## 2022-04-05 NOTE — TELEPHONE ENCOUNTER
"Phone Number Called: [phone]    Call outcome: Spoke to patient regarding message below.    Message: Called to inform patient that per Liss, \"Please call patient to notify her that it looks like an old prescription had resurfaced (or had been requested ) and it was refilled for the 1/2 tablet.  My apologies.  Thank you for bringing that to our attention.  She can continue with the 1 full tablet like she has been doing.  Please inquire if she is okay with a 90-day prescription and let me know if I need to send refills  Thank you\"     Per patient, she is almost out of her medication (3 pills left) and is requesting a refill with the right script.               ----- Message from TK Gaitan sent at 4/5/2022  5:56 AM PDT -----  Regarding: FW: EUTHYROX 100 MCG TAB PRO      ----- Message -----  From: Marlene Yi R.N.  Sent: 4/4/2022   3:26 PM PDT  To: TK Gaitan  Subject: FW: EUTHYROX 100 MCG TAB PRO                     Hey, looks like on 03/02/2022 the med was refilled with directions for 1/2 tab. Couldn't find the reasoning to explain to the patient, but it sounds like she is not taking the 1/2. Last lab was 03/17 and it was normal. Saw an encounter between you and Vaughn from December stating she needs to be on the 100mcg daily. What would you like her to do?   ----- Message -----  From: Anjum Ruff  Sent: 4/4/2022   3:20 PM PDT  To: [facility] Clinical Staff  Subject: EUTHYROX 100 MCG TAB PRO                         JOSE LUIS Leija,  I need correct the script on the above medication.  I have been taking 1 full tablet for some time. Do you think that have recorded enough blood tests to verify that the proper strength has been proven? As the script stands I need to order every 30 days.      "

## 2022-07-07 ENCOUNTER — OFFICE VISIT (OUTPATIENT)
Dept: URGENT CARE | Facility: PHYSICIAN GROUP | Age: 82
End: 2022-07-07
Payer: MEDICARE

## 2022-07-07 ENCOUNTER — HOSPITAL ENCOUNTER (OUTPATIENT)
Dept: RADIOLOGY | Facility: MEDICAL CENTER | Age: 82
End: 2022-07-07
Attending: INTERNAL MEDICINE
Payer: MEDICARE

## 2022-07-07 VITALS
HEIGHT: 65 IN | SYSTOLIC BLOOD PRESSURE: 118 MMHG | RESPIRATION RATE: 18 BRPM | OXYGEN SATURATION: 95 % | TEMPERATURE: 98.7 F | BODY MASS INDEX: 26.66 KG/M2 | WEIGHT: 160 LBS | HEART RATE: 75 BPM | DIASTOLIC BLOOD PRESSURE: 82 MMHG

## 2022-07-07 DIAGNOSIS — K59.00 CONSTIPATION, UNSPECIFIED CONSTIPATION TYPE: ICD-10-CM

## 2022-07-07 PROCEDURE — 99213 OFFICE O/P EST LOW 20 MIN: CPT | Performed by: INTERNAL MEDICINE

## 2022-07-07 PROCEDURE — 74018 RADEX ABDOMEN 1 VIEW: CPT

## 2022-07-07 RX ORDER — DOCUSATE SODIUM 100 MG/1
100 CAPSULE, LIQUID FILLED ORAL 2 TIMES DAILY
Qty: 60 CAPSULE | Refills: 0 | Status: SHIPPED | OUTPATIENT
Start: 2022-07-07 | End: 2023-02-08

## 2022-07-07 RX ORDER — SENNOSIDES A AND B 8.6 MG/1
8.6 TABLET, FILM COATED ORAL
Qty: 30 TABLET | Refills: 0 | Status: SHIPPED | OUTPATIENT
Start: 2022-07-07 | End: 2023-02-08

## 2022-07-07 ASSESSMENT — ENCOUNTER SYMPTOMS
HEARTBURN: 1
ABDOMINAL PAIN: 0
VOMITING: 0
NAUSEA: 0
DIARRHEA: 0
CONSTIPATION: 1

## 2022-07-07 NOTE — PROGRESS NOTES
Chief Complaint:      HPI:      Anjum Ruff is a 81 y.o. female with constipation since Sunday last week.  Comorbid issues include primary hypothyroidism treated with levothyroxine.  Her last TSH was normal.  She denies severe abdominal pain.  She reports straining while defecating.  She denies lumpy or hard stools.  No nausea or vomiting.        ROS:   Review of Systems   Gastrointestinal: Positive for constipation and heartburn. Negative for abdominal pain, diarrhea, nausea and vomiting.        Past Medical History:  She   Patient Active Problem List    Diagnosis Date Noted   • Macular degeneration of left eye 09/28/2021   • Pulmonary hypertension (HCC) 10/21/2019   • Gastroesophageal reflux disease 10/21/2019   • Aortic heart murmur 09/25/2019   • Chronic respiratory failure with hypoxia ([facility]) 03/13/2019   • Essential tremor 03/13/2019   • Eczema 03/13/2019   • Uses hearing aid 03/13/2019   • Stress 12/10/2018   • Vitamin D deficiency 09/07/2018   • Arthritis 09/05/2018   • Gallbladder sludge 09/05/2018   • Varicose vein of leg 09/05/2018   • Memory loss 09/05/2018   • Hypothyroid      Past Surgical History:  She   Past Surgical History:   Procedure Laterality Date   • HIATAL HERNIA REPAIR     • TONSILLECTOMY        Allergies:  She Vitamin d   Current Medications:  She   Current Outpatient Medications:   •  levothyroxine (EUTHYROX) 100 MCG Tab, Take 1 Tablet by mouth every morning on an empty stomach., Disp: 90 Tablet, Rfl: 2  •  citalopram (CELEXA) 20 MG Tab, Take 1 tablet by mouth once daily, Disp: 90 Tablet, Rfl: 3  •  triamcinolone acetonide (KENALOG) 0.1 % Cream, Apply 1 g to affected area(s) 2 times a day., Disp: 1 Tube, Rfl: 3  •  diphenhydrAMINE (BENADRYL) 25 MG Tab, Take 25 mg by mouth every 6 hours as needed for Sleep., Disp: , Rfl:   •  meloxicam (MOBIC) 15 MG tablet, Take 1 Tab by mouth every day., Disp: 90 Tab, Rfl: 0  •  lansoprazole (PREVACID) 15 MG CAPSULE DELAYED RELEASE, Take 2 Caps  "by mouth every day., Disp: 30 Cap, Rfl:   •  calcium carbonate (TUMS) 500 MG Chew Tab, Take 500 mg by mouth every day., Disp: , Rfl:   Social History:  She   Social History     Socioeconomic History   • Marital status:      Spouse name: Not on file   • Number of children: Not on file   • Years of education: Not on file   • Highest education level: Not on file   Occupational History   • Not on file   Tobacco Use   • Smoking status: Former Smoker   • Smokeless tobacco: Never Used   Substance and Sexual Activity   • Alcohol use: Yes     Comment: occ   • Drug use: No   • Sexual activity: Yes     Partners: Male   Other Topics Concern   • Not on file   Social History Narrative   • Not on file     Social Determinants of Health     Financial Resource Strain: Not on file   Food Insecurity: Not on file   Transportation Needs: Not on file   Physical Activity: Not on file   Stress: Not on file   Social Connections: Not on file   Intimate Partner Violence: Not on file   Housing Stability: Not on file      Family History:   Her   Family History   Problem Relation Age of Onset   • Cancer Mother         lung-smoking   • Cancer Sister         colon mets to liver   • Hypertension Sister         melanoma with mets   • Other Sister         enodmetrial cancer        PHYSICAL EXAM:    Vital signs: /82   Pulse 75   Temp 37.1 °C (98.7 °F) (Tympanic)   Resp 18   Ht 1.638 m (5' 4.5\")   Wt 72.6 kg (160 lb)   SpO2 95%   BMI 27.04 kg/m²   Physical Exam  Constitutional:       Appearance: She is normal weight.   HENT:      Head: Normocephalic and atraumatic.      Mouth/Throat:      Mouth: Mucous membranes are moist.      Pharynx: Oropharynx is clear.   Eyes:      Extraocular Movements: Extraocular movements intact.      Conjunctiva/sclera: Conjunctivae normal.      Pupils: Pupils are equal, round, and reactive to light.   Cardiovascular:      Rate and Rhythm: Normal rate and regular rhythm.      Pulses: Normal pulses.      " Heart sounds: Normal heart sounds.   Pulmonary:      Effort: Pulmonary effort is normal.      Breath sounds: Normal breath sounds.   Abdominal:      General: Abdomen is flat. There is no distension.      Palpations: Abdomen is soft.      Tenderness: There is no abdominal tenderness. There is no guarding or rebound.      Hernia: No hernia is present.      Comments: Diminished bowel sounds   Neurological:      Mental Status: She is alert.         Labs:  No results found for: HBA1C   Lab Results   Component Value Date/Time    CHOLSTRLTOT 162 10/08/2021 0726    TRIGLYCERIDE 126 10/08/2021 0726    HDL 59 10/08/2021 0726    LDL 78 10/08/2021 0726     No results found for: MICROALBCALC, MALBCRT, MALBEXCR, VRGYPO69, MICROALBUR, MICRALB, UMICROALBUM, MICROALBTIM   Lab Results   Component Value Date/Time    CREATININE 0.63 10/08/2021 07:26 AM       [id number] VIEW  Narrative: 7/7/2022 1:00 PM    HISTORY/REASON FOR EXAM:  Gastrointestinal Complaint. Constipation for 5 days.    TECHNIQUE/EXAM DESCRIPTION AND NUMBER OF VIEWS:  1 view(s) of the abdomen.    COMPARISON: None    FINDINGS:  No abnormal bowel dilatation.  Stool volume is moderate.    No worrisome calcification is detected.  Impression: Moderate colonic stool burden.        ASSESSMENT/PLAN:     1. Constipation, unspecified constipation type  Reviewed x-ray findings with patient showing no evidence of bowel obstruction and positive for significant stool burden or constipation.    Advised patient to take some stool softeners increase hydration, increase dietary fiber intake and also try some senna     In addition encouraged patient to recheck thyroid function to help reassure that TSH is normal and to make sure that she is getting adequate thyroid hormone replacement therapy    Follow up with pcp       Return if symptoms worsen or fail to improve.       This patient during there office visit was started on new medication.  Side effects of new medications were discussed  with the patient today in the office. The patient was supplied paperwork on this new medication.    Red flags discussed and when to RTC or seek care in the ER  Supportive care, differential diagnoses, and indications for immediate follow-up discussed with patient.    Pathogenesis of diagnosis discussed including typical length and natural progression.       Instructed to return to  or nearest emergency department if symptoms fail to improve, for any change in condition, further concerns, or new concerning symptoms.  Patient states understanding of the plan of care and discharge instructions.      Finn Morales MD, FACE, TYLER  07/07/22

## 2022-07-19 ENCOUNTER — HOSPITAL ENCOUNTER (OUTPATIENT)
Dept: LAB | Facility: MEDICAL CENTER | Age: 82
End: 2022-07-19
Attending: NURSE PRACTITIONER
Payer: MEDICARE

## 2022-07-19 ENCOUNTER — HOSPITAL ENCOUNTER (OUTPATIENT)
Dept: RADIOLOGY | Facility: MEDICAL CENTER | Age: 82
End: 2022-07-19
Attending: NURSE PRACTITIONER
Payer: MEDICARE

## 2022-07-19 DIAGNOSIS — M54.16 LUMBAR RADICULOPATHY: ICD-10-CM

## 2022-07-19 LAB
T4 FREE SERPL-MCNC: 1.13 NG/DL (ref 0.93–1.7)
TSH SERPL DL<=0.005 MIU/L-ACNC: 6.19 UIU/ML (ref 0.38–5.33)

## 2022-07-19 PROCEDURE — 84443 ASSAY THYROID STIM HORMONE: CPT

## 2022-07-19 PROCEDURE — 36415 COLL VENOUS BLD VENIPUNCTURE: CPT

## 2022-07-19 PROCEDURE — 84439 ASSAY OF FREE THYROXINE: CPT

## 2022-07-19 PROCEDURE — 72148 MRI LUMBAR SPINE W/O DYE: CPT | Mod: ME

## 2022-09-20 ENCOUNTER — OFFICE VISIT (OUTPATIENT)
Dept: MEDICAL GROUP | Facility: MEDICAL CENTER | Age: 82
End: 2022-09-20
Payer: MEDICARE

## 2022-09-20 VITALS
HEIGHT: 65 IN | BODY MASS INDEX: 24.99 KG/M2 | HEART RATE: 71 BPM | TEMPERATURE: 96.9 F | WEIGHT: 150 LBS | OXYGEN SATURATION: 95 % | RESPIRATION RATE: 16 BRPM | SYSTOLIC BLOOD PRESSURE: 110 MMHG | DIASTOLIC BLOOD PRESSURE: 60 MMHG

## 2022-09-20 DIAGNOSIS — R25.1 SHAKINESS: ICD-10-CM

## 2022-09-20 DIAGNOSIS — J01.90 ACUTE SINUSITIS, RECURRENCE NOT SPECIFIED, UNSPECIFIED LOCATION: ICD-10-CM

## 2022-09-20 DIAGNOSIS — F43.9 STRESS: Chronic | ICD-10-CM

## 2022-09-20 DIAGNOSIS — E78.5 DYSLIPIDEMIA: ICD-10-CM

## 2022-09-20 DIAGNOSIS — E03.4 HYPOTHYROIDISM DUE TO ACQUIRED ATROPHY OF THYROID: ICD-10-CM

## 2022-09-20 DIAGNOSIS — K59.00 CONSTIPATION, UNSPECIFIED CONSTIPATION TYPE: ICD-10-CM

## 2022-09-20 PROCEDURE — 99214 OFFICE O/P EST MOD 30 MIN: CPT | Performed by: NURSE PRACTITIONER

## 2022-09-20 RX ORDER — LANSOPRAZOLE 30 MG/1
CAPSULE, DELAYED RELEASE ORAL
COMMUNITY
Start: 2022-07-26

## 2022-09-20 RX ORDER — LEVOTHYROXINE SODIUM 0.1 MG/1
TABLET ORAL
Qty: 102 TABLET | Refills: 2 | Status: SHIPPED | OUTPATIENT
Start: 2022-09-20 | End: 2023-06-20

## 2022-09-20 ASSESSMENT — PATIENT HEALTH QUESTIONNAIRE - PHQ9: CLINICAL INTERPRETATION OF PHQ2 SCORE: 0

## 2022-09-20 NOTE — PROGRESS NOTES
"Chief Complaint   Patient presents with    Results    Sinusitis    Pharyngitis       Subjective:     HPI:     Anjum Ruff is a 81 y.o. female   here to discuss the evaluation and management of:     Thyroid  Presents to follow up with fairly recent labs that were ordered from GI.  TSH was mildly elevated.  She reportedly has been compliant with taking the levothyroxine 100 mcg daily.  She had reached out via [facility] for further instructions.    Recently advised to start taking 1 tablet 5 days a week and then 1.5 tablet 2 days a week.   Having constipation, skin changes, fatigue.   She feels tired after eating.  Recommend incorporating protein at each meal.     Latest Reference Range & Units 7/19/22 09:11   TSH 0.380 - 5.330 uIU/mL 6.190 (H)   Free T-4 0.93 - 1.70 ng/dL 1.13     Patient also mentions that for quite some time she will have intermittent episodes of shakiness.  States she will have a \"wave of a sensation like a haze\" that will come over her and then her hands will start to shake.  She denies any diaphoresis or syncopal episode.  This does not happen daily.  States may be 1-2 times a week.  She has noticed a slight increase in the frequency however.  She tells me that having \" sugar\" like a juice will resolve her symptoms.    Patient also has been having nasal congestion, sore throat and postnasal drip for close to a week.  Tried some over-the-counter products.  States her symptoms are feeling better however traveling in the next week.  Tells me that in the mornings the nasal discharge is green however will clear by the end of the day.    Will message by Friday if not feeling better and will and antibiotic.     She is wondering if she can reduce or taper off of the Celexa.  Feels in a better state at this time.  Initially started on this is secondary to the death of her .    She also reported having her colonoscopy and EGD completed recently.  We will be requesting records.    ROS: : " "see above      Current Outpatient Medications:     lansoprazole (PREVACID) 30 MG CAPSULE DELAYED RELEASE, TAKE 1 CAPSULE BY MOUTH ONCE DAILY ON AN EMPTY STOMACH 30 MINUTES PRIOR TO EATING OR DRINKING., Disp: , Rfl:     levothyroxine (EUTHYROX) 100 MCG Tab, Take 1 tablets by mouth daily x 5 days a week and 1.5 tablets daily for 2 days a week., Disp: 102 Tablet, Rfl: 2    citalopram (CELEXA) 20 MG Tab, Take 1 tablet by mouth once daily, Disp: 90 Tablet, Rfl: 3    triamcinolone acetonide (KENALOG) 0.1 % Cream, Apply 1 g to affected area(s) 2 times a day., Disp: 1 Tube, Rfl: 3    diphenhydrAMINE (BENADRYL) 25 MG Tab, Take 25 mg by mouth every 6 hours as needed for Sleep., Disp: , Rfl:     docusate sodium (COLACE) 100 MG Cap, Take 1 Capsule by mouth 2 times a day., Disp: 60 Capsule, Rfl: 0    sennosides (SENOKOT) 8.6 MG Tab, Take 1 Tablet by mouth 1 time a day as needed (constipation)., Disp: 30 Tablet, Rfl: 0    Allergies   Allergen Reactions    Vitamin D      On loading dose-\"sick to her stomach\"       Past Medical History:   Diagnosis Date    Dental infection     waiting for dental infection.    Hashimoto's thyroiditis     Hiatal hernia     Hypothyroid     Macular degeneration     Ocular migraine     Other specified abdominal hernia without obstruction or gangrene 9/5/2018    Post menopausal syndrome     Reflux      Past Surgical History:   Procedure Laterality Date    HIATAL HERNIA REPAIR      TONSILLECTOMY       Family History   Problem Relation Age of Onset    Cancer Mother         lung-smoking    Cancer Sister         colon mets to liver    Hypertension Sister         melanoma with mets    Other Sister         enodmetrial cancer     Social History     Socioeconomic History    Marital status:      Spouse name: Not on file    Number of children: Not on file    Years of education: Not on file    Highest education level: Not on file   Occupational History    Not on file   Tobacco Use    Smoking status: Former " "   Smokeless tobacco: Never   Substance and Sexual Activity    Alcohol use: Yes     Comment: occ    Drug use: No    Sexual activity: Yes     Partners: Male   Other Topics Concern    Not on file   Social History Narrative    Not on file     Social Determinants of Health     Financial Resource Strain: Not on file   Food Insecurity: Not on file   Transportation Needs: Not on file   Physical Activity: Not on file   Stress: Not on file   Social Connections: Not on file   Intimate Partner Violence: Not on file   Housing Stability: Not on file       Objective:     Vitals: /60 (BP Location: Right arm, Patient Position: Sitting, BP Cuff Size: Adult)   Pulse 71   Temp 36.1 °C (96.9 °F) (Temporal)   Resp 16   Ht 1.638 m (5' 4.5\")   Wt 68 kg (150 lb)   SpO2 95%   BMI 25.35 kg/m²    General: Alert, pleasant, NAD  HEENT: Normocephalic.  Neck supple.  Nasal congestion present  Respiratory: no distress, no audible wheezing, RR -WNL  Skin: Warm, dry, no rashes.  Extremities: No leg edema. No discoloration  Neurological: No tremors  Psych:  Affect/mood is normal, judgement is good, memory is intact, grooming is appropriate.    Assessment/Plan:     Anjum was seen today for results, sinusitis and pharyngitis.    Diagnoses and all orders for this visit:    Hypothyroidism due to acquired atrophy of thyroid  Chronic.  Current TSH elevated.  Patient is symptomatic.  Continue with adjusted dose of levothyroxine at 100 mcg, 1 tablet daily x5 days a week and 1.5 tablets daily x2 days a week.  She will recheck her blood work in about 6 to 8 weeks and we will message her regarding the results and adjust accordingly.  -     TSH WITH REFLEX TO FT4; Future  -     levothyroxine (EUTHYROX) 100 MCG Tab; Take 1 tablets by mouth daily x 5 days a week and 1.5 tablets daily for 2 days a week.    Constipation, unspecified constipation type  MiraLAX helpful.  Adjust thyroid medicine.    Shakiness  New problem to me, ongoing for some time.  " Patient reports drinking juice resolves her symptoms.  Question hypoglycemia.  Patient does not have a diagnosis of diabetes or is on any medications that would cause hypoglycemia at this time.  Recommend CMP.  Will message via results.  Recommend protein with meal.  Follow-up if symptoms persist or worsen may need further work-up.    Dyslipidemia  Update labs.  -     Comp Metabolic Panel; Future  -     Lipid Profile; Future    Acute sinusitis, recurrence not specified, unspecified location  Acute, self-limiting.  Recommend gargle with salt water, Flonase, Mucinex if needed.  She already takes Benadryl however she will start to wean off of this she does not feel like she needs a daily anymore.  She will message via Friday if her symptoms do not improve and I will send medication if this is progressed to bacterial sinusitis.    Stress  Patient would like to taper down off of the Celexa.  Recommend cutting tablet in half for 2 weeks then take every other day x 2 weeks then take every 2 days for 1 week then stop.     Return in about 6 months (around 3/20/2023).          Liss NORTH.

## 2022-09-20 NOTE — LETTER
[facility]  Liss Tate A.P.R.N.  [address] [facility] [address]  [place] [address]  Fax: [phone]   Authorization for Release/Disclosure of   Protected Health Information   Name: ANGEL ZAMORA : 1940 SSN: [id number]   Address: [address] [place] [address] [place] [address] Phone:    [phone] (home)    I authorize the entity listed below to release/disclose the PHI below to:   [facility]/Liss Tate, A.P.R.N. and Liss Tate A.P.R.N.   Provider or Entity Name:  [facility]   Address   City, State, [facility]   Phone:      Fax:     Reason for request: continuity of care   Information to be released:    [  x] LAST COLONOSCOPY,  including any PATH REPORT and follow-up  [  ] LAST FIT/COLOGUARD RESULT [  ] LAST DEXA  [  ] LAST MAMMOGRAM  [  ] LAST PAP  [  ] LAST LABS [  ] RETINA EXAM REPORT  [  ] IMMUNIZATION RECORDS  [  ] Release all info      [  ] Check here and initial the line next to each item to release ALL health information INCLUDING  _____ Care and treatment for drug and / or alcohol abuse  _____ HIV testing, infection status, or AIDS  _____ Genetic Testing    DATES OF SERVICE OR TIME PERIOD TO BE DISCLOSED: _____________  I understand and acknowledge that:  * This Authorization may be revoked at any time by you in writing, except if your health information has already been used or disclosed.  * Your health information that will be used or disclosed as a result of you signing this authorization could be re-disclosed by the recipient. If this occurs, your re-disclosed health information may no longer be protected by State or Federal laws.  * You may refuse to sign this Authorization. Your refusal will not affect your ability to obtain treatment.  * This Authorization becomes effective upon signing and will  on (date) __________.      If no date is indicated, this Authorization will  one (1) year from the signature date.    Name: Angel Owen  Speedy    Signature:   Date:     9/20/2022       PLEASE FAX REQUESTED RECORDS BACK TO: [phone]

## 2022-10-07 DIAGNOSIS — F43.9 STRESS: ICD-10-CM

## 2022-10-09 RX ORDER — CITALOPRAM 20 MG/1
TABLET ORAL
Qty: 90 TABLET | Refills: 0 | Status: SHIPPED | OUTPATIENT
Start: 2022-10-09 | End: 2023-02-08

## 2022-11-03 ENCOUNTER — PATIENT MESSAGE (OUTPATIENT)
Dept: HEALTH INFORMATION MANAGEMENT | Facility: OTHER | Age: 82
End: 2022-11-03

## 2022-11-22 ENCOUNTER — HOSPITAL ENCOUNTER (OUTPATIENT)
Dept: LAB | Facility: MEDICAL CENTER | Age: 82
End: 2022-11-22
Attending: NURSE PRACTITIONER
Payer: MEDICARE

## 2022-11-22 DIAGNOSIS — E78.5 DYSLIPIDEMIA: ICD-10-CM

## 2022-11-22 DIAGNOSIS — E03.4 HYPOTHYROIDISM DUE TO ACQUIRED ATROPHY OF THYROID: ICD-10-CM

## 2022-11-22 LAB
ALBUMIN SERPL BCP-MCNC: 4.4 G/DL (ref 3.2–4.9)
ALBUMIN/GLOB SERPL: 1.4 G/DL
ALP SERPL-CCNC: 69 U/L (ref 30–99)
ALT SERPL-CCNC: 12 U/L (ref 2–50)
ANION GAP SERPL CALC-SCNC: 11 MMOL/L (ref 7–16)
AST SERPL-CCNC: 17 U/L (ref 12–45)
BILIRUB SERPL-MCNC: 0.3 MG/DL (ref 0.1–1.5)
BUN SERPL-MCNC: 19 MG/DL (ref 8–22)
CALCIUM SERPL-MCNC: 9.6 MG/DL (ref 8.5–10.5)
CHLORIDE SERPL-SCNC: 102 MMOL/L (ref 96–112)
CHOLEST SERPL-MCNC: 178 MG/DL (ref 100–199)
CO2 SERPL-SCNC: 27 MMOL/L (ref 20–33)
CREAT SERPL-MCNC: 0.68 MG/DL (ref 0.5–1.4)
FASTING STATUS PATIENT QL REPORTED: NORMAL
GFR SERPLBLD CREATININE-BSD FMLA CKD-EPI: 88 ML/MIN/1.73 M 2
GLOBULIN SER CALC-MCNC: 3.2 G/DL (ref 1.9–3.5)
GLUCOSE SERPL-MCNC: 90 MG/DL (ref 65–99)
HDLC SERPL-MCNC: 66 MG/DL
LDLC SERPL CALC-MCNC: 92 MG/DL
POTASSIUM SERPL-SCNC: 4.2 MMOL/L (ref 3.6–5.5)
PROT SERPL-MCNC: 7.6 G/DL (ref 6–8.2)
SODIUM SERPL-SCNC: 140 MMOL/L (ref 135–145)
TRIGL SERPL-MCNC: 99 MG/DL (ref 0–149)
TSH SERPL DL<=0.005 MIU/L-ACNC: 0.56 UIU/ML (ref 0.38–5.33)

## 2022-11-22 PROCEDURE — 36415 COLL VENOUS BLD VENIPUNCTURE: CPT

## 2022-11-22 PROCEDURE — 80053 COMPREHEN METABOLIC PANEL: CPT

## 2022-11-22 PROCEDURE — 80061 LIPID PANEL: CPT

## 2022-11-22 PROCEDURE — 84443 ASSAY THYROID STIM HORMONE: CPT

## 2023-02-08 ENCOUNTER — OFFICE VISIT (OUTPATIENT)
Dept: MEDICAL GROUP | Facility: MEDICAL CENTER | Age: 83
End: 2023-02-08
Payer: MEDICARE

## 2023-02-08 VITALS
HEART RATE: 68 BPM | SYSTOLIC BLOOD PRESSURE: 146 MMHG | BODY MASS INDEX: 26.16 KG/M2 | TEMPERATURE: 97.7 F | OXYGEN SATURATION: 97 % | HEIGHT: 65 IN | DIASTOLIC BLOOD PRESSURE: 82 MMHG | WEIGHT: 157 LBS

## 2023-02-08 DIAGNOSIS — R03.0 ELEVATED BLOOD PRESSURE READING: ICD-10-CM

## 2023-02-08 DIAGNOSIS — K59.00 CONSTIPATION, UNSPECIFIED CONSTIPATION TYPE: ICD-10-CM

## 2023-02-08 DIAGNOSIS — E03.4 HYPOTHYROIDISM DUE TO ACQUIRED ATROPHY OF THYROID: ICD-10-CM

## 2023-02-08 DIAGNOSIS — L98.9 SKIN LESIONS: ICD-10-CM

## 2023-02-08 DIAGNOSIS — L57.0 KERATOSIS: ICD-10-CM

## 2023-02-08 PROCEDURE — 99214 OFFICE O/P EST MOD 30 MIN: CPT | Performed by: NURSE PRACTITIONER

## 2023-02-08 RX ORDER — HYDROCHLOROTHIAZIDE 12.5 MG/1
12.5 CAPSULE, GELATIN COATED ORAL DAILY
Qty: 30 CAPSULE | Refills: 1 | Status: SHIPPED | OUTPATIENT
Start: 2023-02-08 | End: 2023-03-21 | Stop reason: SDUPTHER

## 2023-02-08 ASSESSMENT — PATIENT HEALTH QUESTIONNAIRE - PHQ9: CLINICAL INTERPRETATION OF PHQ2 SCORE: 0

## 2023-02-08 NOTE — PROGRESS NOTES
"Chief Complaint   Patient presents with    Thyroid Follow-up     Subjective:     HPI:     Anjum Ruff is a 82 y.o. female   here to discuss the evaluation and management of:     She presents today concerned about her blood pressure. She has had some swelling in her right lower leg. Non pitting. She has been trying to watch her salt intake.   Denies CP or palpitations. Has had very few headaches.   No recent travel.     She is also having constipation.   For some time.   Stopped taking her Metamucil.  Last TSH was 0.560-  Drinking water and reports taking fiber.   -Recommend starting on Metamucil.     She has skin lesions on her face that are bothersome and would like to have them removed.     ROS: : see above      Current Outpatient Medications:     lansoprazole (PREVACID) 30 MG CAPSULE DELAYED RELEASE, TAKE 1 CAPSULE BY MOUTH ONCE DAILY ON AN EMPTY STOMACH 30 MINUTES PRIOR TO EATING OR DRINKING., Disp: , Rfl:     levothyroxine (EUTHYROX) 100 MCG Tab, Take 1 tablets by mouth daily x 5 days a week and 1.5 tablets daily for 2 days a week., Disp: 102 Tablet, Rfl: 2    triamcinolone acetonide (KENALOG) 0.1 % Cream, Apply 1 g to affected area(s) 2 times a day., Disp: 1 Tube, Rfl: 3    diphenhydrAMINE (BENADRYL) 25 MG Tab, Take 25 mg by mouth every 6 hours as needed for Sleep., Disp: , Rfl:     Allergies   Allergen Reactions    Vitamin D      On loading dose-\"sick to her stomach\"       Past Medical History:   Diagnosis Date    Dental infection     waiting for dental infection.    Hashimoto's thyroiditis     Hiatal hernia     Hypothyroid     Macular degeneration     Ocular migraine     Other specified abdominal hernia without obstruction or gangrene 9/5/2018    Post menopausal syndrome     Reflux      Past Surgical History:   Procedure Laterality Date    HIATAL HERNIA REPAIR      TONSILLECTOMY       Family History   Problem Relation Age of Onset    Cancer Mother         lung-smoking    Cancer Sister         " "colon mets to liver    Hypertension Sister         melanoma with mets    Other Sister         enodmetrial cancer     Social History     Socioeconomic History    Marital status:      Spouse name: Not on file    Number of children: Not on file    Years of education: Not on file    Highest education level: Not on file   Occupational History    Not on file   Tobacco Use    Smoking status: Former    Smokeless tobacco: Never   Substance and Sexual Activity    Alcohol use: Yes     Comment: occ    Drug use: No    Sexual activity: Yes     Partners: Male   Other Topics Concern    Not on file   Social History Narrative    Not on file     Social Determinants of Health     Financial Resource Strain: Not on file   Food Insecurity: Not on file   Transportation Needs: Not on file   Physical Activity: Not on file   Stress: Not on file   Social Connections: Not on file   Intimate Partner Violence: Not on file   Housing Stability: Not on file       Objective:     Vitals: BP (!) 146/82 (BP Location: Right arm, Patient Position: Sitting, BP Cuff Size: Adult)   Pulse 68   Temp 36.5 °C (97.7 °F) (Temporal)   Ht 1.638 m (5' 4.5\")   Wt 71.2 kg (157 lb)   SpO2 97%   BMI 26.53 kg/m²    General: Alert, pleasant, NAD  HEENT: Normocephalic.  Neck supple.   Respiratory: no distress, no audible wheezing, RR -WNL  Skin: Warm, dry, no rashes.  Extremities: No leg edema. No discoloration  Neurological: No tremors  Psych:  Affect/mood is normal, judgement is good, memory is intact, grooming is appropriate.    Assessment/Plan:     1. Elevated blood pressure reading  Mildly elevated BP in clinic. Recommend starting on low dose HCTZ. Check BMP in 2 weeks. Follow up to review.   If able check BP at home.    - Basic Metabolic Panel; Future  - MICROALBUMIN CREAT RATIO URINE; Future    2. Hypothyroidism due to acquired atrophy of thyroid  Chronic. TSH stable. Continue levothyroxine 100 mcg. Recheck in 6-8 weeks.   - TSH; Future    3. Skin " lesions  - Referral to Dermatology    4. Keratosis  - Referral to Dermatology    5. Constipation, unspecified constipation type  Chronic, intermittent issue.   Do not suspect 2/2 to thyroid as her most recent TSH was WNL. Recommend restarting Metamucil, increasing fiber, water intake.       Return in about 4 weeks (around 3/8/2023) for Lab results, Blood Pressure.          Liss NORTH.

## 2023-03-15 ENCOUNTER — HOSPITAL ENCOUNTER (OUTPATIENT)
Dept: LAB | Facility: MEDICAL CENTER | Age: 83
End: 2023-03-15
Attending: NURSE PRACTITIONER
Payer: MEDICARE

## 2023-03-15 DIAGNOSIS — E03.4 HYPOTHYROIDISM DUE TO ACQUIRED ATROPHY OF THYROID: ICD-10-CM

## 2023-03-15 DIAGNOSIS — R03.0 ELEVATED BLOOD PRESSURE READING: ICD-10-CM

## 2023-03-15 LAB
ANION GAP SERPL CALC-SCNC: 14 MMOL/L (ref 7–16)
BUN SERPL-MCNC: 23 MG/DL (ref 8–22)
CALCIUM SERPL-MCNC: 9.6 MG/DL (ref 8.5–10.5)
CHLORIDE SERPL-SCNC: 100 MMOL/L (ref 96–112)
CO2 SERPL-SCNC: 23 MMOL/L (ref 20–33)
CREAT SERPL-MCNC: 0.72 MG/DL (ref 0.5–1.4)
CREAT UR-MCNC: 59.01 MG/DL
FASTING STATUS PATIENT QL REPORTED: NORMAL
GFR SERPLBLD CREATININE-BSD FMLA CKD-EPI: 84 ML/MIN/1.73 M 2
GLUCOSE SERPL-MCNC: 92 MG/DL (ref 65–99)
MICROALBUMIN UR-MCNC: <1.2 MG/DL
MICROALBUMIN/CREAT UR: NORMAL MG/G (ref 0–30)
POTASSIUM SERPL-SCNC: 4.1 MMOL/L (ref 3.6–5.5)
SODIUM SERPL-SCNC: 137 MMOL/L (ref 135–145)
TSH SERPL DL<=0.005 MIU/L-ACNC: 1.34 UIU/ML (ref 0.38–5.33)

## 2023-03-15 PROCEDURE — 84443 ASSAY THYROID STIM HORMONE: CPT

## 2023-03-15 PROCEDURE — 80048 BASIC METABOLIC PNL TOTAL CA: CPT

## 2023-03-15 PROCEDURE — 82043 UR ALBUMIN QUANTITATIVE: CPT

## 2023-03-15 PROCEDURE — 82570 ASSAY OF URINE CREATININE: CPT

## 2023-03-15 PROCEDURE — 36415 COLL VENOUS BLD VENIPUNCTURE: CPT

## 2023-03-21 ENCOUNTER — OFFICE VISIT (OUTPATIENT)
Dept: MEDICAL GROUP | Facility: MEDICAL CENTER | Age: 83
End: 2023-03-21
Payer: MEDICARE

## 2023-03-21 VITALS
OXYGEN SATURATION: 96 % | HEIGHT: 65 IN | BODY MASS INDEX: 25.49 KG/M2 | DIASTOLIC BLOOD PRESSURE: 62 MMHG | TEMPERATURE: 98.3 F | HEART RATE: 71 BPM | SYSTOLIC BLOOD PRESSURE: 118 MMHG | WEIGHT: 153 LBS

## 2023-03-21 DIAGNOSIS — M41.9 MILD SCOLIOSIS: ICD-10-CM

## 2023-03-21 DIAGNOSIS — R03.0 ELEVATED BLOOD PRESSURE READING: ICD-10-CM

## 2023-03-21 DIAGNOSIS — I27.20 PULMONARY HYPERTENSION (HCC): Chronic | ICD-10-CM

## 2023-03-21 DIAGNOSIS — E03.4 HYPOTHYROIDISM DUE TO ACQUIRED ATROPHY OF THYROID: Chronic | ICD-10-CM

## 2023-03-21 DIAGNOSIS — J96.11 CHRONIC RESPIRATORY FAILURE WITH HYPOXIA (HCC): Chronic | ICD-10-CM

## 2023-03-21 PROCEDURE — 99214 OFFICE O/P EST MOD 30 MIN: CPT | Performed by: NURSE PRACTITIONER

## 2023-03-21 RX ORDER — HYDROCHLOROTHIAZIDE 12.5 MG/1
12.5 CAPSULE, GELATIN COATED ORAL DAILY
Qty: 90 CAPSULE | Refills: 3 | Status: SHIPPED | OUTPATIENT
Start: 2023-03-21

## 2023-03-21 NOTE — PROGRESS NOTES
"Chief Complaint   Patient presents with    Hypertension Follow-up     1 Mth    Lab Results     DOS: 3/15/2023       Subjective:     HPI:     Anjum Ruff is a 82 y.o. female here to for follow-up, lab review      Patient presents today to follow-up from previous visit.  Have reviewed her blood work.  Metabolic panel normal, electrolytes stable.  Thyroid stable.  She is tolerating the HCTZ 12.5 mg without any common side effects.  She is trying to stay hydrated.  No leg swelling.    Patient does have mild scoliosis which was documented on a x-ray back in 2020.  She complains of having some intermittent numbness/tingling in the left upper back area.  She does report occasional be feeling fatigued and achy in her upper back.    She continues to wear her 2 L of oxygen at night.    ROS: : see above        Current Outpatient Medications:     hydrochlorothiazide (MICROZIDE) 12.5 MG capsule, Take 1 Capsule by mouth every day., Disp: 90 Capsule, Rfl: 3    lansoprazole (PREVACID) 30 MG CAPSULE DELAYED RELEASE, TAKE 1 CAPSULE BY MOUTH ONCE DAILY ON AN EMPTY STOMACH 30 MINUTES PRIOR TO EATING OR DRINKING., Disp: , Rfl:     levothyroxine (EUTHYROX) 100 MCG Tab, Take 1 tablets by mouth daily x 5 days a week and 1.5 tablets daily for 2 days a week., Disp: 102 Tablet, Rfl: 2    triamcinolone acetonide (KENALOG) 0.1 % Cream, Apply 1 g to affected area(s) 2 times a day., Disp: 1 Tube, Rfl: 3    diphenhydrAMINE (BENADRYL) 25 MG Tab, Take 25 mg by mouth every 6 hours as needed for Sleep., Disp: , Rfl:     Allergies   Allergen Reactions    Vitamin D      On loading dose-\"sick to her stomach\"       Objective:     Vitals: /62 (BP Location: Right arm, Patient Position: Sitting, BP Cuff Size: Adult)   Pulse 71   Temp 36.8 °C (98.3 °F) (Temporal)   Ht 1.638 m (5' 4.5\")   Wt 69.4 kg (153 lb)   SpO2 96%   BMI 25.86 kg/m²    General: Alert, pleasant, NAD  HEENT: Normocephalic.  Neck supple.   Respiratory: no distress, no " audible wheezing, RR -WNL  Skin: Warm, dry, no rashes.  Extremities: No leg edema. No discoloration  Neurological: No tremors  Psych:  Affect/mood is normal, judgement is good, memory is intact, grooming is appropriate.    Assessment/Plan:      1. Elevated blood pressure reading  Improved on today's visit since starting on low-dose HCTZ.  Electrolytes stable.  Continue HCTZ 12.5, recommend monitoring for adequate hydration and avoiding excessive salt.  May use compression stockings in the summer if she notices some leg swelling.  There is also some degree of venous insufficiency.  - hydrochlorothiazide (MICROZIDE) 12.5 MG capsule; Take 1 Capsule by mouth every day.  Dispense: 90 Capsule; Refill: 3    2. Hypothyroidism due to acquired atrophy of thyroid  TSH stable.  Continue levothyroxine.    3. Chronic respiratory failure with hypoxia (HCC)  Chronic problem, stable.  Compliant with 2 L of oxygen at night.    4. Pulmonary hypertension (HCC)  Stable.  Asymptomatic at this time.  Last echocardiogram 2019 RVSP mild at 36mmHG. Previously declined following up with pulmonology.  Monitor.       5. Mild scoliosis  Stable.  Intermittent mid thoracic back discomfort.  Recommend stretching exercises for now.  Symptoms worsen we will update imaging with scoliosis study.      Return in about 6 months (around 9/21/2023).    {I have placed the above orders and discussed them with an approved delegating provider. The MA is performing the below orders under the direction of Dr. Rosa FREY

## 2023-04-28 ENCOUNTER — TELEPHONE (OUTPATIENT)
Dept: HEALTH INFORMATION MANAGEMENT | Facility: OTHER | Age: 83
End: 2023-04-28
Payer: MEDICARE

## 2023-06-17 DIAGNOSIS — E03.4 HYPOTHYROIDISM DUE TO ACQUIRED ATROPHY OF THYROID: ICD-10-CM

## 2023-06-20 RX ORDER — LEVOTHYROXINE SODIUM 0.1 MG/1
TABLET ORAL
Qty: 102 TABLET | Refills: 1 | Status: SHIPPED | OUTPATIENT
Start: 2023-06-20 | End: 2023-11-30

## 2023-09-21 ENCOUNTER — OFFICE VISIT (OUTPATIENT)
Dept: MEDICAL GROUP | Facility: MEDICAL CENTER | Age: 83
End: 2023-09-21
Payer: MEDICARE

## 2023-09-21 VITALS
OXYGEN SATURATION: 96 % | SYSTOLIC BLOOD PRESSURE: 116 MMHG | HEIGHT: 64 IN | DIASTOLIC BLOOD PRESSURE: 80 MMHG | HEART RATE: 84 BPM | TEMPERATURE: 98.6 F | WEIGHT: 148 LBS | BODY MASS INDEX: 25.27 KG/M2

## 2023-09-21 DIAGNOSIS — R43.2 ABNORMAL SENSE OF TASTE: ICD-10-CM

## 2023-09-21 DIAGNOSIS — I83.892 VARICOSE VEINS OF LEFT LOWER EXTREMITY WITH OTHER COMPLICATIONS: ICD-10-CM

## 2023-09-21 DIAGNOSIS — R09.81 NASAL CONGESTION: ICD-10-CM

## 2023-09-21 DIAGNOSIS — J31.0 RHINITIS, UNSPECIFIED TYPE: ICD-10-CM

## 2023-09-21 DIAGNOSIS — L30.9 ECZEMA, UNSPECIFIED TYPE: ICD-10-CM

## 2023-09-21 DIAGNOSIS — R00.2 HEART PALPITATIONS: ICD-10-CM

## 2023-09-21 DIAGNOSIS — K59.00 CONSTIPATION, UNSPECIFIED CONSTIPATION TYPE: ICD-10-CM

## 2023-09-21 PROCEDURE — 99214 OFFICE O/P EST MOD 30 MIN: CPT | Performed by: NURSE PRACTITIONER

## 2023-09-21 PROCEDURE — 3079F DIAST BP 80-89 MM HG: CPT | Performed by: NURSE PRACTITIONER

## 2023-09-21 PROCEDURE — 3074F SYST BP LT 130 MM HG: CPT | Performed by: NURSE PRACTITIONER

## 2023-09-21 RX ORDER — TRIAMCINOLONE ACETONIDE 1 MG/G
1 CREAM TOPICAL 2 TIMES DAILY
Qty: 1 EACH | Refills: 3 | Status: SHIPPED | OUTPATIENT
Start: 2023-09-21

## 2023-09-21 NOTE — PROGRESS NOTES
"Chief Complaint   Patient presents with    Follow-Up     Q6M     Subjective:     HPI:     Anjum Ruff is a 82 y.o. female here to discuss the followin month follow up    She mentions having reduced sense of taste within the last year.   She has chronic sinus issues.   Taking Benadryl for this. No dental issues.   Up to date on dental exams. No mouth lesions.   Started on HCTZ around 2023. -which is noted to cause ageusia   Notes symptoms started around then.  She is not to bothered by this and would like to continue with the HCTZ    She reports having a \"some activity\" on her chest. Has noticed this happen a few times lately. It happened this week. No overt CP, VANG, or dizziness. She just wanted to mentions this and will follow up if this happens again.     She also mentions having nasal congestion/allergies.  -recommend trial of Flonase, gargle with salt water, nasal saline.     She tells me her varicose veins sometimes get hot. No redness. No over swelling. Just a warm/hot sensation.    -recommend compression for this.     Constipation: she had f/u with GI for this.   Her constipation is stable.      She tells me she was seen by Dermatology since her last OV.   She reports having a benign mole removed.     ROS: : see above        Current Outpatient Medications:     triamcinolone acetonide (KENALOG) 0.1 % Cream, Apply 1 g topically 2 times a day., Disp: 1 Each, Rfl: 3    levothyroxine (SYNTHROID) 100 MCG Tab, TAKE 1 TABLET BY MOUTH ONCE DAILY FOR 5 DAYS A  WEEK  AND  TAKE  1.5  TABLETS  BY  MOUTH  2  DAYS  A  WEEK, Disp: 102 Tablet, Rfl: 1    hydrochlorothiazide (MICROZIDE) 12.5 MG capsule, Take 1 Capsule by mouth every day., Disp: 90 Capsule, Rfl: 3    lansoprazole (PREVACID) 30 MG CAPSULE DELAYED RELEASE, TAKE 1 CAPSULE BY MOUTH ONCE DAILY ON AN EMPTY STOMACH 30 MINUTES PRIOR TO EATING OR DRINKING., Disp: , Rfl:     diphenhydrAMINE (BENADRYL) 25 MG Tab, Take 25 mg by mouth every 6 hours as " "needed for Sleep., Disp: , Rfl:     Allergies   Allergen Reactions    Vitamin D      On loading dose-\"sick to her stomach\"       Objective:     Vitals: /80   Pulse 84   Temp 37 °C (98.6 °F)   Ht 1.626 m (5' 4\")   Wt 67.1 kg (148 lb)   SpO2 96%   BMI 25.40 kg/m²    General: Alert, pleasant, NAD  HEENT: Normocephalic.  Neck supple.   Respiratory: no distress, no audible wheezing, RR -WNL  Heart. HRR> no arrhythmia present in exam,   Skin: Warm, dry, no rashes.  Extremities: No leg edema. No discoloration  Neurological: No tremors  Psych:  Affect/mood is normal, judgement is good, memory is intact, grooming is appropriate.    Assessment/Plan:      1. Eczema, unspecified type  Acute. Trial of Triamcinolone.   - triamcinolone acetonide (KENALOG) 0.1 % Cream; Apply 1 g topically 2 times a day.  Dispense: 1 Each; Refill: 3    2. Abnormal sense of taste  Mild-started shortly after starting on HCTZ. She is not too bothered by this and does not want to stop the HCTZ.  She may trial reducing the HCTZ to every other day to see if this can help improve symptoms.     3. Nasal congestion  4. Rhinitis, unspecified type  Recommend trial of Flonase, gargle with salt water, nasal saline.    5. Constipation, unspecified constipation type  Chronic. Stable. Continue Metamucil.    6. Varicose veins of left lower extremity with other complications  Chronic. Stable. Recommend compression stockings.     7. Heart palpitations  New problem-?exact symptoms as she describes as a \"pinching or activity across her chest\" She is having a hard time describing her symptoms-  She denies over CP pain, VANG, dizziness or activity intolerance.  We have discussed possible cardiac monitor/EKG  She defers work up at this time and will follow up if symptoms persist.  ER precautions.     Return in about 6 months (around 3/21/2024).      {I have placed the above orders and discussed them with an approved delegating provider. The MA is performing the " below orders under the direction of Dr. Rosa FREY

## 2023-11-30 DIAGNOSIS — E03.4 HYPOTHYROIDISM DUE TO ACQUIRED ATROPHY OF THYROID: ICD-10-CM

## 2023-11-30 RX ORDER — LEVOTHYROXINE SODIUM 0.1 MG/1
TABLET ORAL
Qty: 102 TABLET | Refills: 1 | Status: SHIPPED | OUTPATIENT
Start: 2023-11-30

## 2024-03-20 ENCOUNTER — OFFICE VISIT (OUTPATIENT)
Dept: MEDICAL GROUP | Facility: MEDICAL CENTER | Age: 84
End: 2024-03-20
Payer: MEDICARE

## 2024-03-20 VITALS
DIASTOLIC BLOOD PRESSURE: 72 MMHG | SYSTOLIC BLOOD PRESSURE: 118 MMHG | TEMPERATURE: 97.6 F | BODY MASS INDEX: 25.95 KG/M2 | WEIGHT: 152 LBS | HEART RATE: 64 BPM | OXYGEN SATURATION: 96 % | HEIGHT: 64 IN | RESPIRATION RATE: 16 BRPM

## 2024-03-20 DIAGNOSIS — I27.20 PULMONARY HYPERTENSION (HCC): Chronic | ICD-10-CM

## 2024-03-20 DIAGNOSIS — J31.0 RHINITIS, UNSPECIFIED TYPE: ICD-10-CM

## 2024-03-20 DIAGNOSIS — R43.2 ABNORMAL SENSE OF TASTE: ICD-10-CM

## 2024-03-20 DIAGNOSIS — E78.5 DYSLIPIDEMIA: ICD-10-CM

## 2024-03-20 DIAGNOSIS — J96.11 CHRONIC RESPIRATORY FAILURE WITH HYPOXIA (HCC): Chronic | ICD-10-CM

## 2024-03-20 DIAGNOSIS — K21.9 GASTROESOPHAGEAL REFLUX DISEASE, UNSPECIFIED WHETHER ESOPHAGITIS PRESENT: Chronic | ICD-10-CM

## 2024-03-20 DIAGNOSIS — E03.4 HYPOTHYROIDISM DUE TO ACQUIRED ATROPHY OF THYROID: ICD-10-CM

## 2024-03-20 DIAGNOSIS — R09.81 NASAL CONGESTION: ICD-10-CM

## 2024-03-20 PROCEDURE — 3078F DIAST BP <80 MM HG: CPT | Performed by: NURSE PRACTITIONER

## 2024-03-20 PROCEDURE — 99214 OFFICE O/P EST MOD 30 MIN: CPT | Performed by: NURSE PRACTITIONER

## 2024-03-20 PROCEDURE — 3074F SYST BP LT 130 MM HG: CPT | Performed by: NURSE PRACTITIONER

## 2024-03-20 RX ORDER — FUROSEMIDE 20 MG/1
20 TABLET ORAL DAILY
Qty: 90 TABLET | Refills: 3 | Status: SHIPPED | OUTPATIENT
Start: 2024-03-20

## 2024-03-20 ASSESSMENT — PATIENT HEALTH QUESTIONNAIRE - PHQ9: CLINICAL INTERPRETATION OF PHQ2 SCORE: 0

## 2024-03-20 NOTE — ASSESSMENT & PLAN NOTE
Stable.  Asymptomatic at this time.  Last echocardiogram 2019 RVSP mild at 36mmHG. Historically she has declined following up with pulmonology. We will continue to monitor.

## 2024-03-20 NOTE — PROGRESS NOTES
"Subjective:     HPI:     Anjum Ruff is a 83 y.o. female presents to discuss:   Chief Complaint   Patient presents with    Follow-Up     Follow up from last OV    ROS: : see above      Current Outpatient Medications:     furosemide (LASIX) 20 MG Tab, Take 1 Tablet by mouth every day., Disp: 90 Tablet, Rfl: 3    levothyroxine (SYNTHROID) 100 MCG Tab, TAKE 1 TABLET BY MOUTH ONCE DAILY FOR  5  DAYS  A  WEEK  AND  TAKE  1.5  TABLETS  2  DAYS  A  WEEK, Disp: 102 Tablet, Rfl: 1    triamcinolone acetonide (KENALOG) 0.1 % Cream, Apply 1 g topically 2 times a day., Disp: 1 Each, Rfl: 3    hydrochlorothiazide (MICROZIDE) 12.5 MG capsule, Take 1 Capsule by mouth every day., Disp: 90 Capsule, Rfl: 3    lansoprazole (PREVACID) 30 MG CAPSULE DELAYED RELEASE, TAKE 1 CAPSULE BY MOUTH ONCE DAILY ON AN EMPTY STOMACH 30 MINUTES PRIOR TO EATING OR DRINKING., Disp: , Rfl:     diphenhydrAMINE (BENADRYL) 25 MG Tab, Take 25 mg by mouth every 6 hours as needed for Sleep., Disp: , Rfl:     Allergies   Allergen Reactions    Vitamin D      On loading dose-\"sick to her stomach\"       Objective:     Vitals: /72   Pulse 64   Temp 36.4 °C (97.6 °F)   Resp 16   Ht 1.626 m (5' 4\")   Wt 68.9 kg (152 lb)   SpO2 96%   BMI 26.09 kg/m²    General: Alert, pleasant, NAD  HEENT: Normocephalic.  Neck supple.   Respiratory: no distress, no audible wheezing, RR -WNL  Skin: Warm, dry, no rashes.  Extremities: No leg edema. No discoloration  Neurological: No tremors  Psych:  Affect/mood is normal, judgement is good, memory is intact, grooming is appropriate.    Assessment/Plan:        Chronic respiratory failure with hypoxia (HCC)  Chronic. Stable with 2L at night.     Pulmonary hypertension (HCC)  Stable.  Asymptomatic at this time.  Last echocardiogram 2019 RVSP mild at 36mmHG. Historically she has declined following up with pulmonology. We will continue to monitor.       Gastroesophageal reflux disease  Chronic.  Continue " lansoprazole.  Avoid trigger foods.  - CBC WITHOUT DIFFERENTIAL; Future    Hypothyroid  Chronic.  Previous TSH stable.  Continue levothyroxine 100 mcg.  Update TSH.     Dyslipidemia  - TSH; Future  - Comp Metabolic Panel; Future    Abnormal sense of taste  No improvement with reduction of HCTZ.  Patient states she is willing to continue taking meds however we also discussed trial of Lasix and replacement of HCTZ.  Recommend checking electrolytes when doing so after starting on Lasix.    Nasal congestion  Rhinitis, unspecified type  Ongoing.  Did not trial Flonase.  Recommend she try the Flonase for at least 4 to 6 weeks to see if she will have some improvement.    Other orders  - furosemide (LASIX) 20 MG Tab; Take 1 Tablet by mouth every day.  Dispense: 90 Tablet; Refill: 3     Return in about 4 months (around 7/20/2024).      Liss NORTH.

## 2024-05-07 ENCOUNTER — OFFICE VISIT (OUTPATIENT)
Dept: URGENT CARE | Facility: PHYSICIAN GROUP | Age: 84
End: 2024-05-07
Payer: MEDICARE

## 2024-05-07 VITALS
OXYGEN SATURATION: 95 % | BODY MASS INDEX: 26.57 KG/M2 | WEIGHT: 155.65 LBS | SYSTOLIC BLOOD PRESSURE: 124 MMHG | DIASTOLIC BLOOD PRESSURE: 64 MMHG | HEART RATE: 75 BPM | HEIGHT: 64 IN | RESPIRATION RATE: 16 BRPM | TEMPERATURE: 96.8 F

## 2024-05-07 DIAGNOSIS — K52.9 AGE (ACUTE GASTROENTERITIS): ICD-10-CM

## 2024-05-07 PROCEDURE — 3078F DIAST BP <80 MM HG: CPT | Performed by: NURSE PRACTITIONER

## 2024-05-07 PROCEDURE — 3074F SYST BP LT 130 MM HG: CPT | Performed by: NURSE PRACTITIONER

## 2024-05-07 PROCEDURE — 99213 OFFICE O/P EST LOW 20 MIN: CPT | Performed by: NURSE PRACTITIONER

## 2024-05-07 RX ORDER — DICYCLOMINE HCL 20 MG
20 TABLET ORAL 4 TIMES DAILY PRN
Qty: 20 TABLET | Refills: 9 | Status: SHIPPED | OUTPATIENT
Start: 2024-05-07 | End: 2024-05-12

## 2024-05-07 ASSESSMENT — ENCOUNTER SYMPTOMS
VOMITING: 0
ABDOMINAL PAIN: 1
HEADACHES: 0
DIZZINESS: 0
MYALGIAS: 0
FEVER: 0
NAUSEA: 0
ROS GI COMMENTS: +LOOSE STOOLS
CHILLS: 0

## 2024-05-07 NOTE — PROGRESS NOTES
Subjective     Anjum Ruff is a 83 y.o. female who presents with Diarrhea (X4 days, having cramping, Hemorrhoid)            HPI  New problem.  Patient is a very pleasant 83-year-old female who presents with looser than normal stools for the past 4 days as well as lower abdominal cramping.  She denies fever, chills but does states she had nausea the first day.  She states the abdominal cramping is only after she eats.  She denies any blood or pus in her stools.  She has not taken any medications for the symptoms.    Vitamin d  Current Outpatient Medications on File Prior to Visit   Medication Sig Dispense Refill    levothyroxine (SYNTHROID) 100 MCG Tab TAKE 1 TABLET BY MOUTH ONCE DAILY FOR  5  DAYS  A  WEEK  AND  TAKE  1.5  TABLETS  2  DAYS  A  WEEK 102 Tablet 1    triamcinolone acetonide (KENALOG) 0.1 % Cream Apply 1 g topically 2 times a day. 1 Each 3    hydrochlorothiazide (MICROZIDE) 12.5 MG capsule Take 1 Capsule by mouth every day. 90 Capsule 3    lansoprazole (PREVACID) 30 MG CAPSULE DELAYED RELEASE TAKE 1 CAPSULE BY MOUTH ONCE DAILY ON AN EMPTY STOMACH 30 MINUTES PRIOR TO EATING OR DRINKING.      diphenhydrAMINE (BENADRYL) 25 MG Tab Take 25 mg by mouth every 6 hours as needed for Sleep.      furosemide (LASIX) 20 MG Tab Take 1 Tablet by mouth every day. (Patient not taking: Reported on 5/7/2024) 90 Tablet 3     No current facility-administered medications on file prior to visit.     Social History     Socioeconomic History    Marital status:      Spouse name: Not on file    Number of children: Not on file    Years of education: Not on file    Highest education level: Not on file   Occupational History    Not on file   Tobacco Use    Smoking status: Former    Smokeless tobacco: Never   Vaping Use    Vaping Use: Never used   Substance and Sexual Activity    Alcohol use: Yes     Comment: occ    Drug use: No    Sexual activity: Yes     Partners: Male   Other Topics Concern    Not on file  "  Social History Narrative    Not on file     Social Determinants of Health     Financial Resource Strain: Not on file   Food Insecurity: Not on file   Transportation Needs: Not on file   Physical Activity: Not on file   Stress: Not on file   Social Connections: Not on file   Intimate Partner Violence: Not on file   Housing Stability: Not on file     Breast Cancer-related family history is not on file.      Review of Systems   Constitutional:  Negative for chills, fever and malaise/fatigue.   Gastrointestinal:  Positive for abdominal pain. Negative for nausea and vomiting.        +loose stools   Musculoskeletal:  Negative for myalgias.   Neurological:  Negative for dizziness and headaches.              Objective     /64 (BP Location: Left arm, Patient Position: Sitting, BP Cuff Size: Adult)   Pulse 75   Temp 36 °C (96.8 °F)   Resp 16   Ht 1.613 m (5' 3.5\")   Wt 70.6 kg (155 lb 10.3 oz)   SpO2 95%   BMI 27.14 kg/m²      Physical Exam  Vitals and nursing note reviewed.   Constitutional:       Appearance: Normal appearance.   Cardiovascular:      Rate and Rhythm: Normal rate and regular rhythm.      Heart sounds: No murmur heard.  Pulmonary:      Effort: Pulmonary effort is normal.      Breath sounds: Normal breath sounds.   Abdominal:      General: Abdomen is flat. Bowel sounds are increased.      Palpations: Abdomen is soft.   Musculoskeletal:         General: Normal range of motion.   Skin:     General: Skin is warm and dry.   Neurological:      General: No focal deficit present.      Mental Status: She is alert and oriented to person, place, and time.                             Assessment & Plan        1. AGE (acute gastroenteritis)  dicyclomine (BENTYL) 20 MG Tab        Return precautions discussed with patient.   Stools not diarrhea but loose with cramping.  Bentyl.  Differential diagnosis, natural history, supportive care, and indications for immediate follow-up were discussed.                   "

## 2024-05-13 ENCOUNTER — HOSPITAL ENCOUNTER (OUTPATIENT)
Dept: LAB | Facility: MEDICAL CENTER | Age: 84
End: 2024-05-13
Attending: NURSE PRACTITIONER
Payer: MEDICARE

## 2024-05-13 DIAGNOSIS — E78.5 DYSLIPIDEMIA: ICD-10-CM

## 2024-05-13 DIAGNOSIS — K21.9 GASTROESOPHAGEAL REFLUX DISEASE, UNSPECIFIED WHETHER ESOPHAGITIS PRESENT: Chronic | ICD-10-CM

## 2024-05-13 LAB
ALBUMIN SERPL BCP-MCNC: 4.2 G/DL (ref 3.2–4.9)
ALBUMIN/GLOB SERPL: 1.4 G/DL
ALP SERPL-CCNC: 61 U/L (ref 30–99)
ALT SERPL-CCNC: 8 U/L (ref 2–50)
ANION GAP SERPL CALC-SCNC: 10 MMOL/L (ref 7–16)
AST SERPL-CCNC: 16 U/L (ref 12–45)
BILIRUB SERPL-MCNC: 0.2 MG/DL (ref 0.1–1.5)
BUN SERPL-MCNC: 15 MG/DL (ref 8–22)
CALCIUM ALBUM COR SERPL-MCNC: 9.2 MG/DL (ref 8.5–10.5)
CALCIUM SERPL-MCNC: 9.4 MG/DL (ref 8.5–10.5)
CHLORIDE SERPL-SCNC: 103 MMOL/L (ref 96–112)
CO2 SERPL-SCNC: 27 MMOL/L (ref 20–33)
CREAT SERPL-MCNC: 0.66 MG/DL (ref 0.5–1.4)
ERYTHROCYTE [DISTWIDTH] IN BLOOD BY AUTOMATED COUNT: 52.6 FL (ref 35.9–50)
FASTING STATUS PATIENT QL REPORTED: NORMAL
GFR SERPLBLD CREATININE-BSD FMLA CKD-EPI: 88 ML/MIN/1.73 M 2
GLOBULIN SER CALC-MCNC: 3.1 G/DL (ref 1.9–3.5)
GLUCOSE SERPL-MCNC: 89 MG/DL (ref 65–99)
HCT VFR BLD AUTO: 38.8 % (ref 37–47)
HGB BLD-MCNC: 12.7 G/DL (ref 12–16)
MCH RBC QN AUTO: 31.1 PG (ref 27–33)
MCHC RBC AUTO-ENTMCNC: 32.7 G/DL (ref 32.2–35.5)
MCV RBC AUTO: 94.9 FL (ref 81.4–97.8)
PLATELET # BLD AUTO: 221 K/UL (ref 164–446)
PMV BLD AUTO: 11.8 FL (ref 9–12.9)
POTASSIUM SERPL-SCNC: 4.1 MMOL/L (ref 3.6–5.5)
PROT SERPL-MCNC: 7.3 G/DL (ref 6–8.2)
RBC # BLD AUTO: 4.09 M/UL (ref 4.2–5.4)
SODIUM SERPL-SCNC: 140 MMOL/L (ref 135–145)
TSH SERPL DL<=0.005 MIU/L-ACNC: 2.32 UIU/ML (ref 0.38–5.33)
WBC # BLD AUTO: 5.4 K/UL (ref 4.8–10.8)

## 2024-05-29 RX ORDER — HYDROCHLOROTHIAZIDE 12.5 MG/1
12.5 CAPSULE, GELATIN COATED ORAL DAILY
Qty: 90 CAPSULE | Refills: 3 | Status: SHIPPED | OUTPATIENT
Start: 2024-05-29

## 2024-06-11 DIAGNOSIS — E03.4 HYPOTHYROIDISM DUE TO ACQUIRED ATROPHY OF THYROID: ICD-10-CM

## 2024-06-11 NOTE — TELEPHONE ENCOUNTER
Received request via: Pharmacy    Was the patient seen in the last year in this department? Yes    Does the patient have an active prescription (recently filled or refills available) for medication(s) requested? No    Pharmacy Name: [facility] [address] [place] [address], [place] [id number] [facility]     Does the patient have nursing home Plus and need 100 day supply (blood pressure, diabetes and cholesterol meds only)? Patient does not have SCP

## 2024-06-12 RX ORDER — LEVOTHYROXINE SODIUM 0.1 MG/1
TABLET ORAL
Qty: 102 TABLET | Refills: 3 | Status: SHIPPED | OUTPATIENT
Start: 2024-06-12

## 2024-07-17 ENCOUNTER — OFFICE VISIT (OUTPATIENT)
Dept: MEDICAL GROUP | Facility: MEDICAL CENTER | Age: 84
End: 2024-07-17
Payer: MEDICARE

## 2024-07-17 VITALS
WEIGHT: 149 LBS | DIASTOLIC BLOOD PRESSURE: 72 MMHG | TEMPERATURE: 97.2 F | HEIGHT: 64 IN | SYSTOLIC BLOOD PRESSURE: 120 MMHG | HEART RATE: 62 BPM | BODY MASS INDEX: 25.44 KG/M2 | RESPIRATION RATE: 16 BRPM | OXYGEN SATURATION: 95 %

## 2024-07-17 DIAGNOSIS — E03.4 HYPOTHYROIDISM DUE TO ACQUIRED ATROPHY OF THYROID: ICD-10-CM

## 2024-07-17 DIAGNOSIS — K21.9 GASTROESOPHAGEAL REFLUX DISEASE, UNSPECIFIED WHETHER ESOPHAGITIS PRESENT: ICD-10-CM

## 2024-07-17 DIAGNOSIS — R43.2 ABNORMAL SENSE OF TASTE: ICD-10-CM

## 2024-07-17 DIAGNOSIS — J96.11 CHRONIC RESPIRATORY FAILURE WITH HYPOXIA (HCC): ICD-10-CM

## 2024-07-17 PROCEDURE — 3078F DIAST BP <80 MM HG: CPT | Performed by: NURSE PRACTITIONER

## 2024-07-17 PROCEDURE — 3074F SYST BP LT 130 MM HG: CPT | Performed by: NURSE PRACTITIONER

## 2024-07-17 PROCEDURE — 99214 OFFICE O/P EST MOD 30 MIN: CPT | Performed by: NURSE PRACTITIONER

## 2024-07-17 ASSESSMENT — FIBROSIS 4 INDEX: FIB4 SCORE: 2.07

## 2025-01-15 ENCOUNTER — APPOINTMENT (OUTPATIENT)
Dept: MEDICAL GROUP | Facility: MEDICAL CENTER | Age: 85
End: 2025-01-15
Payer: MEDICARE

## 2025-05-13 ENCOUNTER — APPOINTMENT (OUTPATIENT)
Dept: MEDICAL GROUP | Facility: MEDICAL CENTER | Age: 85
End: 2025-05-13
Payer: MEDICARE

## 2025-05-20 ENCOUNTER — OFFICE VISIT (OUTPATIENT)
Dept: MEDICAL GROUP | Facility: MEDICAL CENTER | Age: 85
End: 2025-05-20
Payer: MEDICARE

## 2025-05-20 VITALS
SYSTOLIC BLOOD PRESSURE: 120 MMHG | WEIGHT: 142 LBS | DIASTOLIC BLOOD PRESSURE: 70 MMHG | TEMPERATURE: 97.2 F | OXYGEN SATURATION: 96 % | HEART RATE: 62 BPM | BODY MASS INDEX: 24.24 KG/M2 | RESPIRATION RATE: 14 BRPM | HEIGHT: 64 IN

## 2025-05-20 DIAGNOSIS — R07.2 PRECORDIAL PAIN: ICD-10-CM

## 2025-05-20 DIAGNOSIS — K21.9 GASTROESOPHAGEAL REFLUX DISEASE, UNSPECIFIED WHETHER ESOPHAGITIS PRESENT: ICD-10-CM

## 2025-05-20 DIAGNOSIS — H35.30 MACULAR DEGENERATION OF BOTH EYES, UNSPECIFIED TYPE: ICD-10-CM

## 2025-05-20 DIAGNOSIS — E78.5 DYSLIPIDEMIA: ICD-10-CM

## 2025-05-20 DIAGNOSIS — I27.20 PULMONARY HYPERTENSION (HCC): ICD-10-CM

## 2025-05-20 DIAGNOSIS — E03.4 HYPOTHYROIDISM DUE TO ACQUIRED ATROPHY OF THYROID: ICD-10-CM

## 2025-05-20 DIAGNOSIS — Z78.9 POLST (PHYSICIAN ORDERS FOR LIFE-SUSTAINING TREATMENT): ICD-10-CM

## 2025-05-20 DIAGNOSIS — J96.11 CHRONIC RESPIRATORY FAILURE WITH HYPOXIA (HCC): Primary | ICD-10-CM

## 2025-05-20 PROCEDURE — 3074F SYST BP LT 130 MM HG: CPT | Performed by: NURSE PRACTITIONER

## 2025-05-20 PROCEDURE — 3078F DIAST BP <80 MM HG: CPT | Performed by: NURSE PRACTITIONER

## 2025-05-20 PROCEDURE — 99214 OFFICE O/P EST MOD 30 MIN: CPT | Performed by: NURSE PRACTITIONER

## 2025-05-20 ASSESSMENT — PATIENT HEALTH QUESTIONNAIRE - PHQ9: CLINICAL INTERPRETATION OF PHQ2 SCORE: 0

## 2025-05-20 ASSESSMENT — FIBROSIS 4 INDEX: FIB4 SCORE: 2.1

## 2025-05-20 NOTE — PROGRESS NOTES
Subjective:     Anjum Ruff is a 84 y.o. female presents to discuss:     Chief Complaint   Patient presents with    Follow-Up     Verbal consent was acquired by the patient to use [employer] ambient listening note generation during this visit Yes   History of Present Illness  The patient presents for evaluation of heartburn, chest pain, and macular degeneration.    She continues to utilize oxygen therapy at a rate of 2 liters per night. She has discontinued her association with a specific oxygen supply company.   She reports no shortness of breath or palpitations. An active lifestyle is maintained, including walking for 30 minutes within her home.    Heartburn is managed by taking a partial dose of Pepcid daily, which appears to alleviate her symptoms.  She has self discontinued the lansoprazole as she had concerns about potential cognitive side effects.  She does note that heartburn is exacerbated when lying down and after eating particular following a large meal.  She props up her head at night and avoids eating 3 hours before bedtime.  She does mention that coffee is consumed pretty regularly throughout the entire day.  No bleeding or constipation is reported. MiraLAX and another unspecified medication were discontinued, resulting in improved bowel function. A significant amount of greens is consumed in her diet.    Occasional sharp central chest pain is experienced, but it is not associated with any specific triggers or patterns. No left arm pain, jaw pain, nausea, headaches, dizziness, or blurred vision are reported.    Since last office visit she has also self discontinued the hydrochlorothiazide 12.5 mg.  Denies any swelling to her legs at this time.  Denies any increase in shortness of breath.  She does have a known history of pulmonary hypertension.  Last echocardiogram 2019 noted mild RVSP at 36 mmHg however historically she is declined to follow-up with pulmonology given stability of  "symptoms.      Macular degeneration is present in both eyes, with the left eye being worse. No injections are being received for this condition.    She reports that she is taking her levothyroxine 100 mcg daily.    She would like to complete a POLST form today.  - She does have an advanced directive on file.    ROS: : see above    Current Medications[1]    Allergies[2]    Objective:   Vitals: /70   Pulse 62   Temp 36.2 °C (97.2 °F) (Temporal)   Resp 14   Ht 1.613 m (5' 3.5\")   Wt 64.4 kg (142 lb)   SpO2 96%   BMI 24.76 kg/m²    General: Alert, pleasant, NAD  HEENT: Normocephalic.  Neck supple.   Respiratory: no distress, no audible wheezing, RR -WNL  Heart: Heart regular no murmur, no appreciation of arrhythmia  Skin: Warm, dry, no rashes.  Extremities: No leg edema. No discoloration  Neurological: No tremors  Psych:  Affect/mood is normal, judgement is good, memory is intact, grooming is appropriate.  Assessment/Plan:      1. Hypothyroidism due to acquired atrophy of thyroid  Historically she has been stable on levothyroxine 100 mcg.  Recommend updating blood work.  Continue current regimen.  - FREE THYROXINE; Future  - TSH; Future    2. Gastroesophageal reflux disease, unspecified whether esophagitis present  - CBC WITHOUT DIFFERENTIAL; Future    3. Dyslipidemia  - Lipid Profile; Future    4. Pulmonary hypertension (HCC)    5. Chronic respiratory failure with hypoxia (HCC)  Continue to liters of oxygen at night at home.  Patient is not currently being serviced by DME provider that she is aware of.  - Comp Metabolic Panel; Future    6. Macular degeneration of both eyes, unspecified type    7. Precordial pain    8. POLST (Physician Orders for Life-Sustaining Treatment)     Assessment & Plan  1. Heartburn  Reports experiencing heartburn, particularly when lying down and after eating.  Treatment plan: Currently taking a portion of a Pepcid tablet as needed when symptoms present which if I correctly " understood more days of the week than not.  She self stopped lansoprazole at sometime ago due to concerns regarding long-term memory impairment.  We discussed specific triggers including chocolate, caffeine.  Patient does endorse coffee throughout the day which certainly could be contributing to her exacerbation of symptoms that she experiences when laying down at night.  Continue to prop self up at night, reduce portions to avoid overdistention of stomach.  We did discuss the importance of controlling persistent heartburn as there is risk of mucosal damage.  Patient verbalizes understanding and will continue to monitor bit more closely.      2. Chest pain  Reports occasional sharp pain in the center of the chest without associated symptoms like shortness of breath, palpitations, or dizziness. Pain does not follow a regular pattern and is not associated with physical activity.  Treatment plan: Advised to monitor symptoms and report any changes.  She is mention this at previous office visits without any discernible pattern or progressive nature.  Previously declined any further evaluation.  At this time she would like to continue to monitor her symptoms.  ER precautions.    3. Macular degeneration  Diagnosed with macular degeneration in both eyes, with the left eye being worse.  Continue follow-up with eye care specialist    4. Pulmonary hypertension  History of mild pulmonary hypertension.  Noted on last echo 19.  Asymptomatic at this time.  Treatment plan: Advised to continue using oxygen at night as needed. Monitor for any worsening symptoms such as increased shortness of breath, exercise intolerance, or swelling.    5. Health Maintenance  POLST form extensively reviewed, discussed and completed with patient during her office visit today.  The POLST form was scanned to the patient's chart and provided back to the patient so she may place this on her refrigerator.     Return in about 6 months (around  "11/20/2025).    Liss FREY    Health maintenance:    General Recommendations:   Smoking: recommend complete avoidance of all tobacco products  Alcohol: recommend limiting consumption  Physical Activity: goal is 30 min of moderate activity 5 times a week  Weight Management and Nutrition: high vegetable, high protein diet like the Mediterranean diet, portion control, avoid excessive sugars, Low Glycemic Index foods, adequate hydration, sleep.         [1]   Current Outpatient Medications:     levothyroxine (SYNTHROID) 100 MCG Tab, TAKE 1 TABLET BY MOUTH ONCE A DAY FOR 5 DAYS, AND TAKE 1.5 (ONE AND ONE-HALF) TABLETS 2 DAYS A WEEK, Disp: 102 Tablet, Rfl: 3    triamcinolone acetonide (KENALOG) 0.1 % Cream, Apply 1 g topically 2 times a day., Disp: 1 Each, Rfl: 3  [2]   Allergies  Allergen Reactions    Vitamin D      On loading dose-\"sick to her stomach\"     "

## 2025-05-22 ENCOUNTER — HOSPITAL ENCOUNTER (OUTPATIENT)
Dept: LAB | Facility: MEDICAL CENTER | Age: 85
End: 2025-05-22
Attending: NURSE PRACTITIONER
Payer: MEDICARE

## 2025-05-22 DIAGNOSIS — E78.5 DYSLIPIDEMIA: ICD-10-CM

## 2025-05-22 DIAGNOSIS — E03.4 HYPOTHYROIDISM DUE TO ACQUIRED ATROPHY OF THYROID: ICD-10-CM

## 2025-05-22 DIAGNOSIS — J96.11 CHRONIC RESPIRATORY FAILURE WITH HYPOXIA (HCC): ICD-10-CM

## 2025-05-22 DIAGNOSIS — K21.9 GASTROESOPHAGEAL REFLUX DISEASE, UNSPECIFIED WHETHER ESOPHAGITIS PRESENT: ICD-10-CM

## 2025-05-22 LAB
ALBUMIN SERPL BCP-MCNC: 3.8 G/DL (ref 3.2–4.9)
ALBUMIN/GLOB SERPL: 1 G/DL
ALP SERPL-CCNC: 64 U/L (ref 30–99)
ALT SERPL-CCNC: 15 U/L (ref 2–50)
ANION GAP SERPL CALC-SCNC: 9 MMOL/L (ref 7–16)
AST SERPL-CCNC: 19 U/L (ref 12–45)
BILIRUB SERPL-MCNC: 0.4 MG/DL (ref 0.1–1.5)
BUN SERPL-MCNC: 17 MG/DL (ref 8–22)
CALCIUM ALBUM COR SERPL-MCNC: 9.7 MG/DL (ref 8.5–10.5)
CALCIUM SERPL-MCNC: 9.5 MG/DL (ref 8.5–10.5)
CHLORIDE SERPL-SCNC: 104 MMOL/L (ref 96–112)
CHOLEST SERPL-MCNC: 133 MG/DL (ref 100–199)
CO2 SERPL-SCNC: 25 MMOL/L (ref 20–33)
CREAT SERPL-MCNC: 0.73 MG/DL (ref 0.5–1.4)
ERYTHROCYTE [DISTWIDTH] IN BLOOD BY AUTOMATED COUNT: 53.2 FL (ref 35.9–50)
FASTING STATUS PATIENT QL REPORTED: NORMAL
GFR SERPLBLD CREATININE-BSD FMLA CKD-EPI: 82 ML/MIN/1.73 M 2
GLOBULIN SER CALC-MCNC: 3.8 G/DL (ref 1.9–3.5)
GLUCOSE SERPL-MCNC: 92 MG/DL (ref 65–99)
HCT VFR BLD AUTO: 39.7 % (ref 37–47)
HDLC SERPL-MCNC: 51 MG/DL
HGB BLD-MCNC: 12.6 G/DL (ref 12–16)
LDLC SERPL CALC-MCNC: 62 MG/DL
MCH RBC QN AUTO: 29.6 PG (ref 27–33)
MCHC RBC AUTO-ENTMCNC: 31.7 G/DL (ref 32.2–35.5)
MCV RBC AUTO: 93.4 FL (ref 81.4–97.8)
PLATELET # BLD AUTO: 216 K/UL (ref 164–446)
PMV BLD AUTO: 11.7 FL (ref 9–12.9)
POTASSIUM SERPL-SCNC: 4.4 MMOL/L (ref 3.6–5.5)
PROT SERPL-MCNC: 7.6 G/DL (ref 6–8.2)
RBC # BLD AUTO: 4.25 M/UL (ref 4.2–5.4)
SODIUM SERPL-SCNC: 138 MMOL/L (ref 135–145)
T4 FREE SERPL-MCNC: 1.44 NG/DL (ref 0.93–1.7)
TRIGL SERPL-MCNC: 100 MG/DL (ref 0–149)
TSH SERPL-ACNC: 0.4 UIU/ML (ref 0.38–5.33)
WBC # BLD AUTO: 4.7 K/UL (ref 4.8–10.8)

## 2025-05-22 PROCEDURE — 36415 COLL VENOUS BLD VENIPUNCTURE: CPT

## 2025-05-22 PROCEDURE — 84439 ASSAY OF FREE THYROXINE: CPT

## 2025-05-22 PROCEDURE — 84443 ASSAY THYROID STIM HORMONE: CPT

## 2025-05-22 PROCEDURE — 85027 COMPLETE CBC AUTOMATED: CPT

## 2025-05-22 PROCEDURE — 80061 LIPID PANEL: CPT

## 2025-05-22 PROCEDURE — 80053 COMPREHEN METABOLIC PANEL: CPT

## 2025-05-26 ENCOUNTER — RESULTS FOLLOW-UP (OUTPATIENT)
Dept: MEDICAL GROUP | Facility: MEDICAL CENTER | Age: 85
End: 2025-05-26

## 2025-05-26 PROBLEM — E78.5 DYSLIPIDEMIA: Status: ACTIVE | Noted: 2025-05-26

## 2025-06-10 DIAGNOSIS — E03.4 HYPOTHYROIDISM DUE TO ACQUIRED ATROPHY OF THYROID: ICD-10-CM

## 2025-06-11 RX ORDER — LEVOTHYROXINE SODIUM 100 UG/1
TABLET ORAL
Qty: 102 TABLET | Refills: 3 | Status: SHIPPED | OUTPATIENT
Start: 2025-06-11